# Patient Record
Sex: MALE | Race: WHITE | NOT HISPANIC OR LATINO | Employment: FULL TIME | URBAN - METROPOLITAN AREA
[De-identification: names, ages, dates, MRNs, and addresses within clinical notes are randomized per-mention and may not be internally consistent; named-entity substitution may affect disease eponyms.]

---

## 2018-03-25 ENCOUNTER — HOSPITAL ENCOUNTER (EMERGENCY)
Facility: HOSPITAL | Age: 63
Discharge: HOME | End: 2018-03-25
Attending: EMERGENCY MEDICINE | Admitting: EMERGENCY MEDICINE

## 2018-03-25 VITALS
HEIGHT: 68 IN | BODY MASS INDEX: 27.74 KG/M2 | DIASTOLIC BLOOD PRESSURE: 101 MMHG | TEMPERATURE: 99 F | RESPIRATION RATE: 16 BRPM | HEART RATE: 86 BPM | OXYGEN SATURATION: 98 % | WEIGHT: 183 LBS | SYSTOLIC BLOOD PRESSURE: 146 MMHG

## 2018-03-25 DIAGNOSIS — H10.9 CONJUNCTIVITIS OF LEFT EYE, UNSPECIFIED CONJUNCTIVITIS TYPE: Primary | ICD-10-CM

## 2018-03-25 PROCEDURE — 99282 EMERGENCY DEPT VISIT SF MDM: CPT | Performed by: EMERGENCY MEDICINE

## 2018-03-25 PROCEDURE — 99283 EMERGENCY DEPT VISIT LOW MDM: CPT

## 2018-03-25 RX ORDER — TOBRAMYCIN 3 MG/ML
2 SOLUTION/ DROPS OPHTHALMIC ONCE
Status: COMPLETED | OUTPATIENT
Start: 2018-03-25 | End: 2018-03-25

## 2018-03-25 RX ADMIN — TOBRAMYCIN 2 DROP: 3 SOLUTION OPHTHALMIC at 23:12

## 2018-03-26 NOTE — DISCHARGE INSTRUCTIONS
Use the eyedrop medication that we provided every 4 hours as directed for the next 7 days or until symptoms are totally resolved.  Please return to the ER for any worsening pain, fevers, swelling, redness, discharge, vision changes or any other concerns.

## 2018-03-26 NOTE — ED PROVIDER NOTES
Subjective   History of Present Illness  History of Present Illness    Chief complaint: Eye redness and drainage    Location: Left eye    Quality/Severity:  Moderate redness and drainage    Timing/Duration: Began today    Modifying Factors: None    Narrative: This patient presents for evaluation of new redness and swelling with some drainage from his left eye.  He says that he first noticed it this morning when he woke up.  He had some crusty drainage on his eyelashes.  Throughout the day, the eye has become more itchy and uncomfortable with some redness and some watery drainage.  He says that he feels like his eyelids are getting swollen too.  He denies any vision changes.  He denies any fevers.  He has had some sinus congestion recently.  He denies any cough or difficulties breathing.  Wears prescription eyeglasses but he has not had any recent procedures or injuries to the eye.    Associated Symptoms: As above    Review of Systems   Constitutional: Negative for activity change and fever.   HENT: Positive for congestion, rhinorrhea and sinus pressure. Negative for sore throat.    Eyes: Positive for pain, discharge, redness and itching. Negative for visual disturbance.   Respiratory: Negative for cough and shortness of breath.    Cardiovascular: Negative for chest pain.   Gastrointestinal: Negative for abdominal pain.   Skin: Negative for wound.   Neurological: Negative for syncope and headaches.   All other systems reviewed and are negative.      Past Medical History:   Diagnosis Date   • GERD (gastroesophageal reflux disease)        No Known Allergies    Past Surgical History:   Procedure Laterality Date   • HERNIA REPAIR     • TONSILLECTOMY         History reviewed. No pertinent family history.    Social History     Social History   • Marital status:      Social History Main Topics   • Smoking status: Never Smoker   • Alcohol use No   • Drug use: No     Other Topics Concern   • Not on file       ED Triage  Vitals [03/25/18 2252]   Temp Heart Rate Resp BP SpO2   99 °F (37.2 °C) 86 16 (!) 146/101 98 %      Temp src Heart Rate Source Patient Position BP Location FiO2 (%)   Oral -- -- -- --         Objective   Physical Exam   Constitutional: He is oriented to person, place, and time. He appears well-developed and well-nourished. No distress.   HENT:   Head: Normocephalic and atraumatic.   Right Ear: External ear normal.   Left Ear: External ear normal.   Nose: Nose normal.   Eyes: EOM are normal. Pupils are equal, round, and reactive to light. Right eye exhibits no discharge. Left eye exhibits discharge.   The right eye is totally normal.  Excellent the left eye shows some inflamed conjunctiva diffusely with some watery clear to light brown discharge evident.  The pupil and iris appear normal.   Neck: Normal range of motion. Neck supple.   Pulmonary/Chest: Effort normal. No respiratory distress.   Musculoskeletal: Normal range of motion. He exhibits no edema or deformity.   Neurological: He is alert and oriented to person, place, and time. He exhibits normal muscle tone.   Skin: Skin is warm and dry. No rash noted. He is not diaphoretic. No erythema. No pallor.   Psychiatric: He has a normal mood and affect. His behavior is normal. Judgment and thought content normal.   Nursing note and vitals reviewed.      Procedures         ED Course  ED Course   Comment By Time   Impression presented for some eye redness and drainage.  His exam was consistent with acute conjunctivitis.  We gave him some drops of tobramycin tonight.  Gave him the bottle of tobramycin to continue therapies for the next 7 days.  Advised follow-up with his prior care doctor.  Gave him a work statement as well. Gualberto Hunter MD 03/25 1849                  [place]    Final diagnoses:   Conjunctivitis of left eye, unspecified conjunctivitis type            Gualberto Hunter MD  03/25/18 1102

## 2021-02-20 ENCOUNTER — APPOINTMENT (OUTPATIENT)
Dept: MRI IMAGING | Facility: HOSPITAL | Age: 66
End: 2021-02-20

## 2021-02-20 ENCOUNTER — HOSPITAL ENCOUNTER (EMERGENCY)
Facility: HOSPITAL | Age: 66
Discharge: HOME | End: 2021-02-20
Attending: EMERGENCY MEDICINE | Admitting: EMERGENCY MEDICINE

## 2021-02-20 ENCOUNTER — APPOINTMENT (OUTPATIENT)
Dept: CT IMAGING | Facility: HOSPITAL | Age: 66
End: 2021-02-20

## 2021-02-20 VITALS
OXYGEN SATURATION: 96 % | HEART RATE: 77 BPM | DIASTOLIC BLOOD PRESSURE: 99 MMHG | WEIGHT: 185 LBS | SYSTOLIC BLOOD PRESSURE: 141 MMHG | RESPIRATION RATE: 16 BRPM | HEIGHT: 68 IN | BODY MASS INDEX: 28.04 KG/M2 | TEMPERATURE: 98 F

## 2021-02-20 DIAGNOSIS — H53.9 VISION ABNORMALITIES: Primary | ICD-10-CM

## 2021-02-20 DIAGNOSIS — H52.13 MYOPIA OF BOTH EYES: ICD-10-CM

## 2021-02-20 LAB
ALBUMIN SERPL-MCNC: 4.2 G/DL (ref 3.5–5.2)
ALBUMIN/GLOB SERPL: 1.4 G/DL
ALP SERPL-CCNC: 76 U/L (ref 39–117)
ALT SERPL W P-5'-P-CCNC: 17 U/L (ref 1–41)
ANION GAP SERPL CALCULATED.3IONS-SCNC: 6.7 MMOL/L (ref 5–15)
APTT PPP: 31.2 SECONDS (ref 24.3–38.1)
AST SERPL-CCNC: 13 U/L (ref 1–40)
BASOPHILS # BLD AUTO: 0.07 10*3/MM3 (ref 0–0.2)
BASOPHILS NFR BLD AUTO: 1.2 % (ref 0–1.5)
BILIRUB SERPL-MCNC: 0.3 MG/DL (ref 0–1.2)
BUN SERPL-MCNC: 14 MG/DL (ref 8–23)
BUN/CREAT SERPL: 15.2 (ref 7–25)
CALCIUM SPEC-SCNC: 9.5 MG/DL (ref 8.6–10.5)
CHLORIDE SERPL-SCNC: 103 MMOL/L (ref 98–107)
CO2 SERPL-SCNC: 27.3 MMOL/L (ref 22–29)
CREAT SERPL-MCNC: 0.92 MG/DL (ref 0.76–1.27)
DEPRECATED RDW RBC AUTO: 45.1 FL (ref 37–54)
EOSINOPHIL # BLD AUTO: 0.3 10*3/MM3 (ref 0–0.4)
EOSINOPHIL NFR BLD AUTO: 5 % (ref 0.3–6.2)
ERYTHROCYTE [DISTWIDTH] IN BLOOD BY AUTOMATED COUNT: 13.3 % (ref 12.3–15.4)
GFR SERPL CREATININE-BSD FRML MDRD: 83 ML/MIN/1.73
GLOBULIN UR ELPH-MCNC: 2.9 GM/DL
GLUCOSE SERPL-MCNC: 95 MG/DL (ref 65–99)
HCT VFR BLD AUTO: 45.5 % (ref 37.5–51)
HGB BLD-MCNC: 14.6 G/DL (ref 13–17.7)
IMM GRANULOCYTES # BLD AUTO: 0.01 10*3/MM3 (ref 0–0.05)
IMM GRANULOCYTES NFR BLD AUTO: 0.2 % (ref 0–0.5)
INR PPP: 1.05 (ref 0.9–1.1)
LYMPHOCYTES # BLD AUTO: 1.79 10*3/MM3 (ref 0.7–3.1)
LYMPHOCYTES NFR BLD AUTO: 29.9 % (ref 19.6–45.3)
MCH RBC QN AUTO: 29.4 PG (ref 26.6–33)
MCHC RBC AUTO-ENTMCNC: 32.1 G/DL (ref 31.5–35.7)
MCV RBC AUTO: 91.5 FL (ref 79–97)
MONOCYTES # BLD AUTO: 0.57 10*3/MM3 (ref 0.1–0.9)
MONOCYTES NFR BLD AUTO: 9.5 % (ref 5–12)
NEUTROPHILS NFR BLD AUTO: 3.24 10*3/MM3 (ref 1.7–7)
NEUTROPHILS NFR BLD AUTO: 54.2 % (ref 42.7–76)
NRBC BLD AUTO-RTO: 0 /100 WBC (ref 0–0.2)
PLATELET # BLD AUTO: 310 10*3/MM3 (ref 140–450)
PMV BLD AUTO: 10.5 FL (ref 6–12)
POTASSIUM SERPL-SCNC: 4.3 MMOL/L (ref 3.5–5.2)
PROT SERPL-MCNC: 7.1 G/DL (ref 6–8.5)
PROTHROMBIN TIME: 13.4 SECONDS (ref 12.1–15)
QT INTERVAL: 429 MS
RBC # BLD AUTO: 4.97 10*6/MM3 (ref 4.14–5.8)
SARS-COV-2 RNA PNL SPEC NAA+PROBE: NOT DETECTED
SODIUM SERPL-SCNC: 137 MMOL/L (ref 136–145)
WBC # BLD AUTO: 5.98 10*3/MM3 (ref 3.4–10.8)

## 2021-02-20 PROCEDURE — 85730 THROMBOPLASTIN TIME PARTIAL: CPT | Performed by: PHYSICIAN ASSISTANT

## 2021-02-20 PROCEDURE — 70551 MRI BRAIN STEM W/O DYE: CPT

## 2021-02-20 PROCEDURE — 99283 EMERGENCY DEPT VISIT LOW MDM: CPT | Performed by: PHYSICIAN ASSISTANT

## 2021-02-20 PROCEDURE — 99283 EMERGENCY DEPT VISIT LOW MDM: CPT

## 2021-02-20 PROCEDURE — 85025 COMPLETE CBC W/AUTO DIFF WBC: CPT | Performed by: PHYSICIAN ASSISTANT

## 2021-02-20 PROCEDURE — 85610 PROTHROMBIN TIME: CPT | Performed by: PHYSICIAN ASSISTANT

## 2021-02-20 PROCEDURE — 80053 COMPREHEN METABOLIC PANEL: CPT | Performed by: PHYSICIAN ASSISTANT

## 2021-02-20 PROCEDURE — 70450 CT HEAD/BRAIN W/O DYE: CPT

## 2021-02-20 PROCEDURE — 93010 ELECTROCARDIOGRAM REPORT: CPT | Performed by: INTERNAL MEDICINE

## 2021-02-20 PROCEDURE — 93005 ELECTROCARDIOGRAM TRACING: CPT | Performed by: PHYSICIAN ASSISTANT

## 2021-02-20 PROCEDURE — 87635 SARS-COV-2 COVID-19 AMP PRB: CPT | Performed by: PHYSICIAN ASSISTANT

## 2021-02-20 RX ORDER — SODIUM CHLORIDE 0.9 % (FLUSH) 0.9 %
10 SYRINGE (ML) INJECTION AS NEEDED
Status: DISCONTINUED | OUTPATIENT
Start: 2021-02-20 | End: 2021-02-20 | Stop reason: HOSPADM

## 2021-02-20 RX ORDER — CLOBETASOL PROPIONATE 0.46 MG/ML
SOLUTION TOPICAL
COMMUNITY
Start: 2021-02-12

## 2021-02-20 RX ORDER — FLUTICASONE PROPIONATE 50 MCG
SPRAY, SUSPENSION (ML) NASAL
COMMUNITY
Start: 2020-12-08

## 2021-02-20 RX ORDER — KETOCONAZOLE 20 MG/ML
SHAMPOO TOPICAL
COMMUNITY
Start: 2021-02-12

## 2021-02-20 NOTE — ED PROVIDER NOTES
EMERGENCY DEPARTMENT ENCOUNTER      Room Number: 05/05    History is provided by the patient, no translation services needed    HPI:    Chief complaint: Blurred vision    Location: Patient denies pain    Quality/Severity: 0/10    Timing/Duration: Blurred vision began around 9 AM this morning.    Modifying Factors: Patient states when he looks at objects in a distance he has double vision, he states when he focuses on objects that are close his vision is normal.    Associated Symptoms: Positive for blurred vision.  Denies any headache, eye pain, dizziness, numbness, weakness, difficulty swallowing, slurred speech, gait abnormality, facial droop, nausea, vomiting.    Narrative: Pt is a 65 y.o. male who presents complaining of blurred vision that began around 9 AM this morning.  Patient has a PMH significant for Márquez's esophagus and GERD.  Denies any history of HTN or diabetes.  Patient states he started noticing some blurred vision in eyes this morning around 9 AM.  Patient states he wears bifocal glasses, last had his vision checked about 5 months ago.  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  He states he was at home reading on his tablet just fine this morning, he went out to his car and could see normally, but when he was driving he states he began having blurred vision in the distance and was seeing double.  He reports when he looks at an object up close his vision is normal.  Denies any other symptoms with this.    PMD: Rusty Carr MD    REVIEW OF SYSTEMS  Review of Systems   Constitutional: Negative for chills and fever.   Eyes: Positive for visual disturbance. Negative for pain, discharge and itching.   Respiratory: Negative for cough and shortness of breath.    Cardiovascular: Negative for chest pain and palpitations.   Gastrointestinal: Negative for abdominal pain, nausea and vomiting.    Genitourinary: Negative for difficulty urinating and dysuria.   Musculoskeletal: Negative for arthralgias, gait problem and myalgias.   Skin: Negative for pallor and rash.   Neurological: Negative for dizziness, syncope, facial asymmetry, speech difficulty, weakness, numbness and headaches.   Psychiatric/Behavioral: Negative for confusion. The patient is not nervous/anxious.          PAST MEDICAL HISTORY  Active Ambulatory Problems     Diagnosis Date Noted   • No Active Ambulatory Problems     Resolved Ambulatory Problems     Diagnosis Date Noted   • No Resolved Ambulatory Problems     Past Medical History:   Diagnosis Date   • Márquez esophagus    • GERD (gastroesophageal reflux disease)        PAST SURGICAL HISTORY  Past Surgical History:   Procedure Laterality Date   • COLONOSCOPY     • HERNIA REPAIR     • TONSILLECTOMY         FAMILY HISTORY  History reviewed. No pertinent family history.    SOCIAL HISTORY  Social History     Socioeconomic History   • Marital status:      Spouse name: Not on file   • Number of children: Not on file   • Years of education: Not on file   • Highest education level: Not on file   Tobacco Use   • Smoking status: Never Smoker   • Smokeless tobacco: Never Used   Substance and Sexual Activity   • Alcohol use: No   • Drug use: No   • Sexual activity: Not Currently       ALLERGIES  Patient has no known allergies.      Current Facility-Administered Medications:   •  [COMPLETED] Insert peripheral IV, , , Once **AND** sodium chloride 0.9 % flush 10 mL, 10 mL, Intravenous, PRN, Madisyn Carreno PA-C    Current Outpatient Medications:   •  clobetasol (TEMOVATE) 0.05 % external solution, APPLY TO AFFECTED AREA OF SCALP 2 TIMES A DAY FOR UP TO 2 WEEKS, Disp: , Rfl:   •  fluticasone (FLONASE) 50 MCG/ACT nasal spray, instill 2 sprays in each nostril daily, Disp: , Rfl:   •  fluticasone-salmeterol (Advair Diskus) 100-50 MCG/DOSE DISKUS, INHALE 1 PUFF BY MOUTH EVERY TWELVE HOURS, RINSE  MOUTH AFTER EACH USE, Disp: , Rfl:   •  ketoconazole (NIZORAL) 2 % shampoo, APPLY TO AFFECTED AREA OF SCALP 2 TIMES PER WEEK, RINSE AFTER 5 TO 10 MINUTES, Disp: , Rfl:     PHYSICAL EXAM  ED Triage Vitals [02/20/21 1224]   Temp Heart Rate Resp BP SpO2   98 °F (36.7 °C) 64 18 129/75 98 %      Temp src Heart Rate Source Patient Position BP Location FiO2 (%)   Oral Monitor Sitting Left arm --       Physical Exam   Constitutional: He is oriented to person, place, and time and well-developed, well-nourished, and in no distress.   HENT:   Head: Normocephalic and atraumatic.   Eyes: Pupils are equal, round, and reactive to light. Conjunctivae and EOM are normal.   Visual acuity in left eye 20/30, right eye 20/15   Cardiovascular: Normal rate, regular rhythm and intact distal pulses.   Pulmonary/Chest: Effort normal and breath sounds normal.   Abdominal: Soft. Bowel sounds are normal. There is no abdominal tenderness. There is no guarding.   Musculoskeletal: Normal range of motion.         General: No edema.   Neurological: He is alert and oriented to person, place, and time. He has normal sensation, normal strength and intact cranial nerves. He displays normal speech. He exhibits normal muscle tone. He has a normal Finger-Nose-Finger Test and a normal Heel to Scanlon Test. He shows no pronator drift. Gait normal. GCS score is 15.   Skin: Skin is warm and dry.   Psychiatric: Mood, memory, affect and judgment normal.   Nursing note and vitals reviewed.        LAB RESULTS  Lab Results (last 24 hours)     Procedure Component Value Units Date/Time    CBC & Differential [[id number]]  (Normal) Collected: 02/20/21 1343    Specimen: Blood Updated: 02/20/21 1355    Narrative:      The following orders were created for panel order CBC & Differential.  Procedure                               Abnormality         Status                     ---------                               -----------         ------                     CBC Auto  Differential[[id number]]          Normal              Final result                 Please view results for these tests on the individual orders.    Comprehensive Metabolic Panel [[id number]] Collected: 02/20/21 1343    Specimen: Blood Updated: 02/20/21 1418     Glucose 95 mg/dL      BUN 14 mg/dL      Creatinine 0.92 mg/dL      Sodium 137 mmol/L      Potassium 4.3 mmol/L      Chloride 103 mmol/L      CO2 27.3 mmol/L      Calcium 9.5 mg/dL      Total Protein 7.1 g/dL      Albumin 4.20 g/dL      ALT (SGPT) 17 U/L      AST (SGOT) 13 U/L      Alkaline Phosphatase 76 U/L      Total Bilirubin 0.3 mg/dL      eGFR Non African Amer 83 mL/min/1.73      Globulin 2.9 gm/dL      A/G Ratio 1.4 g/dL      BUN/Creatinine Ratio 15.2     Anion Gap 6.7 mmol/L     Narrative:      GFR Normal >60  Chronic Kidney Disease <60  Kidney Failure <15      aPTT [[id number]]  (Normal) Collected: 02/20/21 1343    Specimen: Blood Updated: 02/20/21 1405     PTT 31.2 seconds     Narrative:      PTT = The equivalent PTT values for the therapeutic range of heparin levels at 0.1 to 0.7 U/ml are 53 to 110 seconds.      Protime-INR [[id number]]  (Normal) Collected: 02/20/21 1343    Specimen: Blood Updated: 02/20/21 1405     Protime 13.4 Seconds      INR 1.05    Narrative:      Therapeutic Ranges for INR: 2.0-3.0 (PT 20-30)                              2.5-3.5 (PT 25-34)    CBC Auto Differential [[id number]]  (Normal) Collected: 02/20/21 1343    Specimen: Blood Updated: 02/20/21 1355     WBC 5.98 10*3/mm3      RBC 4.97 10*6/mm3      Hemoglobin 14.6 g/dL      Hematocrit 45.5 %      MCV 91.5 fL      MCH 29.4 pg      MCHC 32.1 g/dL      RDW 13.3 %      RDW-SD 45.1 fl      MPV 10.5 fL      Platelets 310 10*3/mm3      Neutrophil % 54.2 %      Lymphocyte % 29.9 %      Monocyte % 9.5 %      Eosinophil % 5.0 %      Basophil % 1.2 %      Immature Grans % 0.2 %      Neutrophils, Absolute 3.24 10*3/mm3      Lymphocytes, Absolute 1.79 10*3/mm3      Monocytes, Absolute 0.57 10*3/mm3       Eosinophils, Absolute 0.30 10*3/mm3      Basophils, Absolute 0.07 10*3/mm3      Immature Grans, Absolute 0.01 10*3/mm3      nRBC 0.0 /100 WBC     COVID PRE-OP / PRE-PROCEDURE SCREENING ORDER (NO ISOLATION) - Swab, Nasal Cavity [[id number]]  (Normal) Collected: 02/20/21 1422    Specimen: Swab from Nasal Cavity Updated: 02/20/21 1523    Narrative:      The following orders were created for panel order COVID PRE-OP / PRE-PROCEDURE SCREENING ORDER (NO ISOLATION) - Swab, Nasal Cavity.  Procedure                               Abnormality         Status                     ---------                               -----------         ------                     COVID-19,[facility] IN-HOUSE...[[id number]]  Normal              Final result                 Please view results for these tests on the individual orders.    COVID-19,[facility] IN-HOUSE,Nasal Swab No Transport Media 3-4 HR TAT - Swab, Nasal Cavity [[id number]]  (Normal) Collected: 02/20/21 1422    Specimen: Swab from Nasal Cavity Updated: 02/20/21 1523     COVID19 Not Detected    Narrative:      Fact sheet for providers: https://www.fda.gov/media/990603/download     Fact sheet for patients: https://www.fda.gov/media/393188/download    Test performed by PCR.    Consider negative results in combination with clinical observations, patient history, and epidemiological information.            I ordered the above labs and reviewed the results    RADIOLOGY  Ct Head Without Contrast    Result Date: 2/20/2021  CT Head WO: 2/20/2021 3:36 PM HISTORY: Diplopia, blurred vision TECHNIQUE: Axial unenhanced head CT. Radiation dose reduction techniques included automated exposure control or exposure modulation based on body size. Radiation audit for number of CT and nuclear cardiology exams performed in the last year: 0.  COMPARISON: None. FINDINGS: No intracranial hemorrhage, mass, or infarct. No hydrocephalus or extra-axial fluid collection. The left cerebellar tonsil is slightly ectopic  and there may be a mild Chiari malformation. The skull base, calvarium, and extracranial soft tissues are normal.     No acute intracranial abnormality. Signer Name: Racquel Bonner MD  Signed: 2/20/2021 2:42 PM  Workstation Name: [id number]  [facility]    Mri Brain Without Contrast    Result Date: 2/20/2021  MRI Brain WO INDICATION: Diplopia, concern for stroke TECHNIQUE: Multiplanar MRI of the brain without IV contrast. COMPARISON: None available. FINDINGS: No evidence of restricted diffusion to suggest acute infarct. No evidence of intracranial hemorrhage, mass or mass effect. No extra-axial fluid collections. Again noted is slight downward displacement of the left cerebellar tonsil that may be consistent with a mild Chiari malformation. Visualized osseous structures and extracranial soft tissues appear within normal limits apart from trace mucosal thickening in the paranasal sinuses. Normal intracranial flow-voids.     No acute intracranial abnormality. Signer Name: Racquel Bonner MD  Signed: 2/20/2021 5:19 PM  Workstation Name: [id number]  [facility]      I ordered the above radiologic testing and reviewed the results    PROCEDURES  Procedures      PROGRESS AND CONSULTS  ED Course as of Feb 20 1748   Sat Feb 20, 2021   1335 CONSULT  Discussed case with Dr. Valiente, neurology.  Reviewed history, exam, results and treatments.  Discussed concerns and plan of care. Dr Valiente recommends patient be admitted for MRI at either our facility or [facility].  Advises to proceed with stroke protocol, advises against CTA at this time since we are not suspicious of any large vessel occlusion.  Patient continues to have NIH of 0 with no visual loss or gaze palsy, so is not currently a TPA candidate.        [KS]   1350 EKG         EKG time / Interpretation time: 1342/1345  Rhythm/Rate: Sinus rhythm, 66   NV: 179  QRS, axis: 24  QTc 448  ST and T waves: There is no  significant ST elevation or depression, no T wave inversion.  EKG Tracing Interpreted Contemporaneously by me, independently viewed by me and MD.  No prior EKG with which to compare.      [KS]   1550 Spoke with MRI tech who is coming in to perform MRI at this time.  Discussed with Dr. Davis who states we can go ahead and order MRI brain without contrast, and if MRI is normal patient may be discharged home.    [KS]   7079 Discussed MRI results with patient.  MRI shows no abnormalities or evidence of CVA.  Discussed that I feel his symptoms are suggestive for myopia.  He already has an appointment with ophthalmology scheduled for Monday, discussed he should also follow-up with his PCP.  Discussed return to ER warnings.  Patient verbalizes understanding and is agreeable with this plan.    [KS]      ED Course User Index  [KS] Madisyn Carreno PA-C           MEDICAL DECISION MAKING    MDM      NIHSS (NIH Stroke Scale/Score) reviewed and/or performed as part of the patient evaluation and treatment planning process.  The result associated with this review/performance is: 0    My differential diagnoses for ocular issues includes but is not limited to acute eye pain, chemical conjunctivitis, allergic conjunctivitis, infectious conjunctivitis, gonococcal conjunctivitis, corneal abrasion, corneal ulcer, injury to cornea from contact lens, corneal foreign body.  Corneal alkali burn, corneal acid burn, decreased vision, acute glaucoma, herpes keratitis, hyphema, acute iritis, orbital wall fracture, penetrating eye injury, retinal detachment, temporal arteritis, UV keratitis, central retinal artery occlusion, CVA.      DIAGNOSIS  Final diagnoses:   Vision abnormalities   Myopia of both eyes       Latest Documented Vital Signs:  As of 17:48 EST  BP- 137/100 HR- 70 Temp- 98 °F (36.7 °C) (Oral) O2 sat- 99%    DISPOSITION  Patient discharged home.    Discussed pertinent labs and imaging findings with the patient/family.   Patient/Family voiced understanding of need to follow-up for recheck, further testing as needed.  Return to the emergency Department warnings were given.         Medication List      No changes were made to your prescriptions during this visit.             Follow-up Information     Rusty Carr MD. Call in 2 days.    Specialty: Family Medicine  Why: To schedule follow-up appointment  Contact information:  [address] [place] [id number]  [phone]             Your Opthalmologist. Go in 2 days.    Why: As previously scheduled                   Dictated utilizing Dragon dictation     Madisyn Carreno PA-C  02/20/21 0030

## 2021-09-28 ENCOUNTER — HOSPITAL ENCOUNTER (EMERGENCY)
Facility: HOSPITAL | Age: 66
Discharge: HOME | End: 2021-09-28
Attending: EMERGENCY MEDICINE | Admitting: EMERGENCY MEDICINE

## 2021-09-28 VITALS
HEIGHT: 68 IN | BODY MASS INDEX: 26.52 KG/M2 | WEIGHT: 175 LBS | HEART RATE: 77 BPM | SYSTOLIC BLOOD PRESSURE: 145 MMHG | RESPIRATION RATE: 16 BRPM | DIASTOLIC BLOOD PRESSURE: 93 MMHG | OXYGEN SATURATION: 98 %

## 2021-09-28 DIAGNOSIS — R04.0 EPISTAXIS: Primary | ICD-10-CM

## 2021-09-28 PROCEDURE — 99282 EMERGENCY DEPT VISIT SF MDM: CPT | Performed by: EMERGENCY MEDICINE

## 2021-09-28 PROCEDURE — 99283 EMERGENCY DEPT VISIT LOW MDM: CPT

## 2024-09-19 ENCOUNTER — LAB (OUTPATIENT)
Dept: LAB | Facility: HOSPITAL | Age: 69
End: 2024-09-19
Payer: MEDICARE

## 2024-09-19 ENCOUNTER — TRANSCRIBE ORDERS (OUTPATIENT)
Dept: ADMINISTRATIVE | Facility: HOSPITAL | Age: 69
End: 2024-09-19
Payer: MEDICARE

## 2024-09-19 DIAGNOSIS — L40.0 PSORIASIS VULGARIS: Primary | ICD-10-CM

## 2024-09-19 PROCEDURE — 36415 COLL VENOUS BLD VENIPUNCTURE: CPT

## 2024-09-19 PROCEDURE — 86480 TB TEST CELL IMMUN MEASURE: CPT

## 2024-09-21 LAB
GAMMA INTERFERON BACKGROUND BLD IA-ACNC: 0 IU/ML
M TB IFN-G BLD-IMP: NEGATIVE
M TB IFN-G CD4+ BCKGRND COR BLD-ACNC: 0.02 IU/ML
M TB IFN-G CD4+CD8+ BCKGRND COR BLD-ACNC: 0.03 IU/ML
MITOGEN IGNF BCKGRD COR BLD-ACNC: >10 IU/ML
QUANTIFERON INCUBATION: NORMAL
SERVICE CMNT-IMP: NORMAL

## 2025-06-13 ENCOUNTER — APPOINTMENT (OUTPATIENT)
Dept: GENERAL RADIOLOGY | Facility: HOSPITAL | Age: 70
End: 2025-06-13
Payer: MEDICARE

## 2025-06-13 ENCOUNTER — HOSPITAL ENCOUNTER (EMERGENCY)
Facility: HOSPITAL | Age: 70
Discharge: HOME | End: 2025-06-13
Attending: EMERGENCY MEDICINE
Payer: MEDICARE

## 2025-06-13 VITALS
HEART RATE: 82 BPM | OXYGEN SATURATION: 98 % | SYSTOLIC BLOOD PRESSURE: 123 MMHG | BODY MASS INDEX: 28.34 KG/M2 | HEIGHT: 68 IN | DIASTOLIC BLOOD PRESSURE: 94 MMHG | TEMPERATURE: 98.2 F | WEIGHT: 187 LBS | RESPIRATION RATE: 20 BRPM

## 2025-06-13 DIAGNOSIS — S20.219A CONTUSION OF STERNUM, INITIAL ENCOUNTER: Primary | ICD-10-CM

## 2025-06-13 PROCEDURE — 93010 ELECTROCARDIOGRAM REPORT: CPT | Performed by: INTERNAL MEDICINE

## 2025-06-13 PROCEDURE — 71120 X-RAY EXAM BREASTBONE 2/>VWS: CPT

## 2025-06-13 PROCEDURE — 99283 EMERGENCY DEPT VISIT LOW MDM: CPT | Performed by: EMERGENCY MEDICINE

## 2025-06-13 PROCEDURE — 71045 X-RAY EXAM CHEST 1 VIEW: CPT

## 2025-06-13 PROCEDURE — 93005 ELECTROCARDIOGRAM TRACING: CPT | Performed by: EMERGENCY MEDICINE

## 2025-06-13 RX ORDER — PAROXETINE 10 MG/1
10 TABLET, FILM COATED ORAL EVERY MORNING
COMMUNITY

## 2025-06-14 LAB
QT INTERVAL: 372 MS
QTC INTERVAL: 430 MS
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
## 2025-06-14 NOTE — DISCHARGE INSTRUCTIONS
Ice the injured area for 20 minutes every 2-3 hours while awake for 2 to 3 days.  Take Tylenol or Motrin as needed as directed for pain.  Follow-up with Rusty Carr within 1 week.  Return to the emergency department if there is increased pain, rest of breath, worse in any way at all.

## 2025-07-30 ENCOUNTER — HOSPITAL ENCOUNTER (OUTPATIENT)
Facility: HOSPITAL | Age: 70
Setting detail: OBSERVATION
Discharge: HOME | End: 2025-08-01
Attending: STUDENT IN AN ORGANIZED HEALTH CARE EDUCATION/TRAINING PROGRAM | Admitting: STUDENT IN AN ORGANIZED HEALTH CARE EDUCATION/TRAINING PROGRAM
Payer: MEDICARE

## 2025-07-30 ENCOUNTER — APPOINTMENT (OUTPATIENT)
Dept: CT IMAGING | Facility: HOSPITAL | Age: 70
End: 2025-07-30
Payer: MEDICARE

## 2025-07-30 DIAGNOSIS — R19.5 DARK STOOLS: Primary | ICD-10-CM

## 2025-07-30 DIAGNOSIS — Z87.19 HISTORY OF HEMORRHOIDS: ICD-10-CM

## 2025-07-30 LAB
ABO GROUP BLD: NORMAL
ABO GROUP BLD: NORMAL
ALBUMIN SERPL-MCNC: 4.1 G/DL (ref 3.5–5.2)
ALBUMIN/GLOB SERPL: 1.5 G/DL
ALP SERPL-CCNC: 84 U/L (ref 39–117)
ALT SERPL W P-5'-P-CCNC: 17 U/L (ref 1–41)
ANION GAP SERPL CALCULATED.3IONS-SCNC: 9.9 MMOL/L (ref 5–15)
AST SERPL-CCNC: 15 U/L (ref 1–40)
BASOPHILS # BLD AUTO: 0.11 10*3/MM3 (ref 0–0.2)
BASOPHILS NFR BLD AUTO: 1.8 % (ref 0–1.5)
BILIRUB SERPL-MCNC: 0.2 MG/DL (ref 0–1.2)
BLD GP AB SCN SERPL QL: NEGATIVE
BUN SERPL-MCNC: 18.7 MG/DL (ref 8–23)
BUN/CREAT SERPL: 22.5 (ref 7–25)
CALCIUM SPEC-SCNC: 9.5 MG/DL (ref 8.6–10.5)
CHLORIDE SERPL-SCNC: 107 MMOL/L (ref 98–107)
CO2 SERPL-SCNC: 25.1 MMOL/L (ref 22–29)
CREAT SERPL-MCNC: 0.83 MG/DL (ref 0.76–1.27)
DEPRECATED RDW RBC AUTO: 43.8 FL (ref 37–54)
EGFRCR SERPLBLD CKD-EPI 2021: 95.3 ML/MIN/1.73
EOSINOPHIL # BLD AUTO: 0.44 10*3/MM3 (ref 0–0.4)
EOSINOPHIL NFR BLD AUTO: 7.2 % (ref 0.3–6.2)
ERYTHROCYTE [DISTWIDTH] IN BLOOD BY AUTOMATED COUNT: 13.3 % (ref 12.3–15.4)
GLOBULIN UR ELPH-MCNC: 2.7 GM/DL
GLUCOSE SERPL-MCNC: 97 MG/DL (ref 65–99)
HCT VFR BLD AUTO: 35.1 % (ref 37.5–51)
HCT VFR BLD AUTO: 39.8 % (ref 37.5–51)
HGB BLD-MCNC: 11.4 G/DL (ref 13–17.7)
HGB BLD-MCNC: 13.4 G/DL (ref 13–17.7)
IMM GRANULOCYTES # BLD AUTO: 0.01 10*3/MM3 (ref 0–0.05)
IMM GRANULOCYTES NFR BLD AUTO: 0.2 % (ref 0–0.5)
INR PPP: 1.05 (ref 0.9–1.1)
LIPASE SERPL-CCNC: 47 U/L (ref 13–60)
LYMPHOCYTES # BLD AUTO: 2.26 10*3/MM3 (ref 0.7–3.1)
LYMPHOCYTES NFR BLD AUTO: 36.9 % (ref 19.6–45.3)
MCH RBC QN AUTO: 30.6 PG (ref 26.6–33)
MCHC RBC AUTO-ENTMCNC: 33.7 G/DL (ref 31.5–35.7)
MCV RBC AUTO: 90.9 FL (ref 79–97)
MONOCYTES # BLD AUTO: 0.59 10*3/MM3 (ref 0.1–0.9)
MONOCYTES NFR BLD AUTO: 9.6 % (ref 5–12)
NEUTROPHILS NFR BLD AUTO: 2.71 10*3/MM3 (ref 1.7–7)
NEUTROPHILS NFR BLD AUTO: 44.3 % (ref 42.7–76)
NRBC BLD AUTO-RTO: 0 /100 WBC (ref 0–0.2)
PLATELET # BLD AUTO: 318 10*3/MM3 (ref 140–450)
PMV BLD AUTO: 10.6 FL (ref 6–12)
POTASSIUM SERPL-SCNC: 4.4 MMOL/L (ref 3.5–5.2)
PROT SERPL-MCNC: 6.8 G/DL (ref 6–8.5)
PROTHROMBIN TIME: 14 SECONDS (ref 12.1–15)
RBC # BLD AUTO: 4.38 10*6/MM3 (ref 4.14–5.8)
RH BLD: POSITIVE
RH BLD: POSITIVE
SODIUM SERPL-SCNC: 142 MMOL/L (ref 136–145)
T&S EXPIRATION DATE: NORMAL
WBC NRBC COR # BLD AUTO: 6.12 10*3/MM3 (ref 3.4–10.8)

## 2025-07-30 PROCEDURE — 25810000003 LACTATED RINGERS SOLUTION: Performed by: STUDENT IN AN ORGANIZED HEALTH CARE EDUCATION/TRAINING PROGRAM

## 2025-07-30 PROCEDURE — 85018 HEMOGLOBIN: CPT | Performed by: STUDENT IN AN ORGANIZED HEALTH CARE EDUCATION/TRAINING PROGRAM

## 2025-07-30 PROCEDURE — 99285 EMERGENCY DEPT VISIT HI MDM: CPT | Performed by: STUDENT IN AN ORGANIZED HEALTH CARE EDUCATION/TRAINING PROGRAM

## 2025-07-30 PROCEDURE — 80053 COMPREHEN METABOLIC PANEL: CPT | Performed by: STUDENT IN AN ORGANIZED HEALTH CARE EDUCATION/TRAINING PROGRAM

## 2025-07-30 PROCEDURE — 83690 ASSAY OF LIPASE: CPT | Performed by: STUDENT IN AN ORGANIZED HEALTH CARE EDUCATION/TRAINING PROGRAM

## 2025-07-30 PROCEDURE — 96361 HYDRATE IV INFUSION ADD-ON: CPT

## 2025-07-30 PROCEDURE — 85610 PROTHROMBIN TIME: CPT | Performed by: STUDENT IN AN ORGANIZED HEALTH CARE EDUCATION/TRAINING PROGRAM

## 2025-07-30 PROCEDURE — 85014 HEMATOCRIT: CPT | Performed by: STUDENT IN AN ORGANIZED HEALTH CARE EDUCATION/TRAINING PROGRAM

## 2025-07-30 PROCEDURE — 85025 COMPLETE CBC W/AUTO DIFF WBC: CPT | Performed by: STUDENT IN AN ORGANIZED HEALTH CARE EDUCATION/TRAINING PROGRAM

## 2025-07-30 PROCEDURE — 74174 CTA ABD&PLVS W/CONTRAST: CPT

## 2025-07-30 PROCEDURE — 86850 RBC ANTIBODY SCREEN: CPT | Performed by: STUDENT IN AN ORGANIZED HEALTH CARE EDUCATION/TRAINING PROGRAM

## 2025-07-30 PROCEDURE — 86900 BLOOD TYPING SEROLOGIC ABO: CPT | Performed by: STUDENT IN AN ORGANIZED HEALTH CARE EDUCATION/TRAINING PROGRAM

## 2025-07-30 PROCEDURE — 86901 BLOOD TYPING SEROLOGIC RH(D): CPT

## 2025-07-30 PROCEDURE — 86900 BLOOD TYPING SEROLOGIC ABO: CPT

## 2025-07-30 PROCEDURE — 86901 BLOOD TYPING SEROLOGIC RH(D): CPT | Performed by: STUDENT IN AN ORGANIZED HEALTH CARE EDUCATION/TRAINING PROGRAM

## 2025-07-30 PROCEDURE — 36415 COLL VENOUS BLD VENIPUNCTURE: CPT

## 2025-07-30 PROCEDURE — 96374 THER/PROPH/DIAG INJ IV PUSH: CPT

## 2025-07-30 PROCEDURE — 25510000001 IOPAMIDOL PER 1 ML: Performed by: STUDENT IN AN ORGANIZED HEALTH CARE EDUCATION/TRAINING PROGRAM

## 2025-07-30 RX ORDER — ONDANSETRON 4 MG/1
4 TABLET, ORALLY DISINTEGRATING ORAL EVERY 6 HOURS PRN
Status: DISCONTINUED | OUTPATIENT
Start: 2025-07-30 | End: 2025-08-01 | Stop reason: HOSPADM

## 2025-07-30 RX ORDER — SODIUM CHLORIDE 9 MG/ML
40 INJECTION, SOLUTION INTRAVENOUS AS NEEDED
Status: DISCONTINUED | OUTPATIENT
Start: 2025-07-30 | End: 2025-08-01 | Stop reason: HOSPADM

## 2025-07-30 RX ORDER — SODIUM CHLORIDE 0.9 % (FLUSH) 0.9 %
10 SYRINGE (ML) INJECTION EVERY 12 HOURS SCHEDULED
Status: DISCONTINUED | OUTPATIENT
Start: 2025-07-31 | End: 2025-08-01 | Stop reason: HOSPADM

## 2025-07-30 RX ORDER — BISACODYL 10 MG
10 SUPPOSITORY, RECTAL RECTAL DAILY PRN
Status: DISCONTINUED | OUTPATIENT
Start: 2025-07-30 | End: 2025-08-01 | Stop reason: HOSPADM

## 2025-07-30 RX ORDER — SODIUM CHLORIDE 0.9 % (FLUSH) 0.9 %
10 SYRINGE (ML) INJECTION AS NEEDED
Status: DISCONTINUED | OUTPATIENT
Start: 2025-07-30 | End: 2025-08-01 | Stop reason: HOSPADM

## 2025-07-30 RX ORDER — PANTOPRAZOLE SODIUM 40 MG/10ML
40 INJECTION, POWDER, LYOPHILIZED, FOR SOLUTION INTRAVENOUS ONCE
Status: COMPLETED | OUTPATIENT
Start: 2025-07-31 | End: 2025-07-30

## 2025-07-30 RX ORDER — PANTOPRAZOLE SODIUM 40 MG/10ML
40 INJECTION, POWDER, LYOPHILIZED, FOR SOLUTION INTRAVENOUS
Status: DISCONTINUED | OUTPATIENT
Start: 2025-07-30 | End: 2025-07-30

## 2025-07-30 RX ORDER — AMOXICILLIN 250 MG
2 CAPSULE ORAL 2 TIMES DAILY PRN
Status: DISCONTINUED | OUTPATIENT
Start: 2025-07-30 | End: 2025-08-01 | Stop reason: HOSPADM

## 2025-07-30 RX ORDER — SODIUM CHLORIDE, SODIUM LACTATE, POTASSIUM CHLORIDE, CALCIUM CHLORIDE 600; 310; 30; 20 MG/100ML; MG/100ML; MG/100ML; MG/100ML
75 INJECTION, SOLUTION INTRAVENOUS CONTINUOUS
Status: ACTIVE | OUTPATIENT
Start: 2025-07-31 | End: 2025-08-01

## 2025-07-30 RX ORDER — IOPAMIDOL 755 MG/ML
100 INJECTION, SOLUTION INTRAVASCULAR
Status: COMPLETED | OUTPATIENT
Start: 2025-07-30 | End: 2025-07-30

## 2025-07-30 RX ORDER — POLYETHYLENE GLYCOL 3350 17 G/17G
17 POWDER, FOR SOLUTION ORAL DAILY PRN
Status: DISCONTINUED | OUTPATIENT
Start: 2025-07-30 | End: 2025-08-01 | Stop reason: HOSPADM

## 2025-07-30 RX ORDER — ONDANSETRON 2 MG/ML
4 INJECTION INTRAMUSCULAR; INTRAVENOUS EVERY 6 HOURS PRN
Status: DISCONTINUED | OUTPATIENT
Start: 2025-07-30 | End: 2025-08-01 | Stop reason: HOSPADM

## 2025-07-30 RX ORDER — BISACODYL 5 MG/1
5 TABLET, DELAYED RELEASE ORAL DAILY PRN
Status: DISCONTINUED | OUTPATIENT
Start: 2025-07-30 | End: 2025-08-01 | Stop reason: HOSPADM

## 2025-07-30 RX ADMIN — IOPAMIDOL 100 ML: 755 INJECTION, SOLUTION INTRAVENOUS at 22:10

## 2025-07-30 RX ADMIN — SODIUM CHLORIDE, POTASSIUM CHLORIDE, SODIUM LACTATE AND CALCIUM CHLORIDE 500 ML: 600; 310; 30; 20 INJECTION, SOLUTION INTRAVENOUS at 23:57

## 2025-07-30 RX ADMIN — PANTOPRAZOLE SODIUM 40 MG: 40 INJECTION, POWDER, FOR SOLUTION INTRAVENOUS at 23:55

## 2025-07-30 NOTE — LETTER
Date:       Patient:    Date of Birth:    Date of Visit:        To Whom It May Concern:    It is my medical opinion that            Sincerely,          CC: No Recipients

## 2025-07-31 ENCOUNTER — ANESTHESIA EVENT (OUTPATIENT)
Dept: PERIOP | Facility: HOSPITAL | Age: 70
End: 2025-07-31
Payer: MEDICARE

## 2025-07-31 ENCOUNTER — ANESTHESIA (OUTPATIENT)
Dept: PERIOP | Facility: HOSPITAL | Age: 70
End: 2025-07-31
Payer: MEDICARE

## 2025-07-31 LAB
ANION GAP SERPL CALCULATED.3IONS-SCNC: 8.7 MMOL/L (ref 5–15)
BUN SERPL-MCNC: 16.5 MG/DL (ref 8–23)
BUN/CREAT SERPL: 19.6 (ref 7–25)
CALCIUM SPEC-SCNC: 8.9 MG/DL (ref 8.6–10.5)
CHLORIDE SERPL-SCNC: 106 MMOL/L (ref 98–107)
CO2 SERPL-SCNC: 26.3 MMOL/L (ref 22–29)
CREAT SERPL-MCNC: 0.84 MG/DL (ref 0.76–1.27)
DEPRECATED RDW RBC AUTO: 44.8 FL (ref 37–54)
EGFRCR SERPLBLD CKD-EPI 2021: 95 ML/MIN/1.73
ERYTHROCYTE [DISTWIDTH] IN BLOOD BY AUTOMATED COUNT: 13.3 % (ref 12.3–15.4)
GLUCOSE SERPL-MCNC: 101 MG/DL (ref 65–99)
HCT VFR BLD AUTO: 36 % (ref 37.5–51)
HCT VFR BLD AUTO: 38.1 % (ref 37.5–51)
HCT VFR BLD AUTO: 38.9 % (ref 37.5–51)
HGB BLD-MCNC: 11.7 G/DL (ref 13–17.7)
HGB BLD-MCNC: 12.5 G/DL (ref 13–17.7)
HGB BLD-MCNC: 12.7 G/DL (ref 13–17.7)
MCH RBC QN AUTO: 30.1 PG (ref 26.6–33)
MCHC RBC AUTO-ENTMCNC: 32.8 G/DL (ref 31.5–35.7)
MCV RBC AUTO: 91.8 FL (ref 79–97)
PLATELET # BLD AUTO: 300 10*3/MM3 (ref 140–450)
PMV BLD AUTO: 11.2 FL (ref 6–12)
POTASSIUM SERPL-SCNC: 4.2 MMOL/L (ref 3.5–5.2)
RBC # BLD AUTO: 4.15 10*6/MM3 (ref 4.14–5.8)
SODIUM SERPL-SCNC: 141 MMOL/L (ref 136–145)
WBC NRBC COR # BLD AUTO: 7.72 10*3/MM3 (ref 3.4–10.8)

## 2025-07-31 PROCEDURE — 94761 N-INVAS EAR/PLS OXIMETRY MLT: CPT

## 2025-07-31 PROCEDURE — 25010000002 LIDOCAINE 2% SOLUTION: Performed by: NURSE ANESTHETIST, CERTIFIED REGISTERED

## 2025-07-31 PROCEDURE — 80048 BASIC METABOLIC PNL TOTAL CA: CPT | Performed by: STUDENT IN AN ORGANIZED HEALTH CARE EDUCATION/TRAINING PROGRAM

## 2025-07-31 PROCEDURE — 99204 OFFICE O/P NEW MOD 45 MIN: CPT | Performed by: STUDENT IN AN ORGANIZED HEALTH CARE EDUCATION/TRAINING PROGRAM

## 2025-07-31 PROCEDURE — G0378 HOSPITAL OBSERVATION PER HR: HCPCS

## 2025-07-31 PROCEDURE — 25810000003 LACTATED RINGERS PER 1000 ML: Performed by: STUDENT IN AN ORGANIZED HEALTH CARE EDUCATION/TRAINING PROGRAM

## 2025-07-31 PROCEDURE — 85014 HEMATOCRIT: CPT | Performed by: HOSPITALIST

## 2025-07-31 PROCEDURE — 25010000002 PROPOFOL 10 MG/ML EMULSION: Performed by: NURSE ANESTHETIST, CERTIFIED REGISTERED

## 2025-07-31 PROCEDURE — 87338 HPYLORI STOOL AG IA: CPT | Performed by: STUDENT IN AN ORGANIZED HEALTH CARE EDUCATION/TRAINING PROGRAM

## 2025-07-31 PROCEDURE — 43235 EGD DIAGNOSTIC BRUSH WASH: CPT | Performed by: STUDENT IN AN ORGANIZED HEALTH CARE EDUCATION/TRAINING PROGRAM

## 2025-07-31 PROCEDURE — 96361 HYDRATE IV INFUSION ADD-ON: CPT

## 2025-07-31 PROCEDURE — 99223 1ST HOSP IP/OBS HIGH 75: CPT | Performed by: STUDENT IN AN ORGANIZED HEALTH CARE EDUCATION/TRAINING PROGRAM

## 2025-07-31 PROCEDURE — 85027 COMPLETE CBC AUTOMATED: CPT | Performed by: STUDENT IN AN ORGANIZED HEALTH CARE EDUCATION/TRAINING PROGRAM

## 2025-07-31 PROCEDURE — 85018 HEMOGLOBIN: CPT | Performed by: HOSPITALIST

## 2025-07-31 RX ORDER — NITROGLYCERIN 0.4 MG/1
0.4 TABLET SUBLINGUAL
Status: DISCONTINUED | OUTPATIENT
Start: 2025-07-31 | End: 2025-08-01 | Stop reason: HOSPADM

## 2025-07-31 RX ORDER — SODIUM CHLORIDE, SODIUM LACTATE, POTASSIUM CHLORIDE, CALCIUM CHLORIDE 600; 310; 30; 20 MG/100ML; MG/100ML; MG/100ML; MG/100ML
100 INJECTION, SOLUTION INTRAVENOUS ONCE
Status: DISCONTINUED | OUTPATIENT
Start: 2025-07-31 | End: 2025-07-31 | Stop reason: HOSPADM

## 2025-07-31 RX ORDER — SODIUM PHOSPHATE,MONO-DIBASIC 19G-7G/118
1 ENEMA (ML) RECTAL 2 TIMES DAILY WITH MEALS
COMMUNITY

## 2025-07-31 RX ORDER — LIDOCAINE HYDROCHLORIDE 20 MG/ML
INJECTION, SOLUTION INFILTRATION; PERINEURAL AS NEEDED
Status: DISCONTINUED | OUTPATIENT
Start: 2025-07-31 | End: 2025-07-31 | Stop reason: SURG

## 2025-07-31 RX ORDER — MULTIPLE VITAMINS W/ MINERALS TAB 9MG-400MCG
1 TAB ORAL DAILY
COMMUNITY

## 2025-07-31 RX ORDER — PANTOPRAZOLE SODIUM 40 MG/1
40 TABLET, DELAYED RELEASE ORAL
Status: DISCONTINUED | OUTPATIENT
Start: 2025-08-01 | End: 2025-08-01 | Stop reason: HOSPADM

## 2025-07-31 RX ORDER — TRIAMCINOLONE ACETONIDE 1 MG/G
1 CREAM TOPICAL NIGHTLY
COMMUNITY
Start: 2025-05-14

## 2025-07-31 RX ORDER — PROPOFOL 10 MG/ML
VIAL (ML) INTRAVENOUS AS NEEDED
Status: DISCONTINUED | OUTPATIENT
Start: 2025-07-31 | End: 2025-07-31 | Stop reason: SURG

## 2025-07-31 RX ORDER — ONDANSETRON 2 MG/ML
4 INJECTION INTRAMUSCULAR; INTRAVENOUS ONCE AS NEEDED
Status: DISCONTINUED | OUTPATIENT
Start: 2025-07-31 | End: 2025-07-31 | Stop reason: HOSPADM

## 2025-07-31 RX ADMIN — Medication 10 ML: at 01:10

## 2025-07-31 RX ADMIN — Medication 10 ML: at 20:51

## 2025-07-31 RX ADMIN — SODIUM CHLORIDE, POTASSIUM CHLORIDE, SODIUM LACTATE AND CALCIUM CHLORIDE 75 ML/HR: 600; 310; 30; 20 INJECTION, SOLUTION INTRAVENOUS at 07:38

## 2025-07-31 RX ADMIN — LIDOCAINE HYDROCHLORIDE 100 MG: 20 INJECTION, SOLUTION INFILTRATION; PERINEURAL at 12:19

## 2025-07-31 RX ADMIN — SODIUM CHLORIDE, POTASSIUM CHLORIDE, SODIUM LACTATE AND CALCIUM CHLORIDE 75 ML/HR: 600; 310; 30; 20 INJECTION, SOLUTION INTRAVENOUS at 01:05

## 2025-07-31 RX ADMIN — PROPOFOL 75 MG: 10 INJECTION, EMULSION INTRAVENOUS at 12:23

## 2025-07-31 RX ADMIN — Medication 10 ML: at 08:59

## 2025-07-31 NOTE — ANESTHESIA PREPROCEDURE EVALUATION
Anesthesia Evaluation     Patient summary reviewed and Nursing notes reviewed   no history of anesthetic complications:   NPO Solid Status: > 8 hours  NPO Liquid Status: > 8 hours           Airway   Mallampati: II  TM distance: >3 FB  Neck ROM: full  No difficulty expected  Dental      Pulmonary     breath sounds clear to auscultation  (+) ,sleep apnea (does not use cpap with get inspire)  Cardiovascular - negative cardio ROS  Exercise tolerance: good (4-7 METS)    ECG reviewed  Rhythm: regular  Rate: normal      ROS comment: HEART RATE=80  bpm  RR Jwwejsuy=544  ms  TX Jovgdttb=836  ms  P Horizontal Axis=14  deg  P Front Axis=48  deg  QRSD Interval=97  ms  QT Tkcmmbtm=496  ms  YDkX=484  ms  QRS Axis=20  deg  T Wave Axis=  deg  - BORDERLINE ECG -  Sinus rhythm  Borderline  T abnormalities, lateral leads  When compared with ECG of 20-Feb-2021 13:42:06,  No significant change  Electronically Signed By: Cole Tavarez ([facility]) 2025-06-14 12:25:07  Date and Time of Study:2025-06-13 21:30:20      Neuro/Psych  (+) seizures (epilepsy and medicated in 1987 last sz was when he was 19yo), psychiatric history Anxiety, poor historian (memory issues).  GI/Hepatic/Renal/Endo    (+) GERD poorly controlled    ROS Comment: Márquez esophagus    Musculoskeletal (-) negative ROS    Abdominal    Substance History - negative use     OB/GYN          Other - negative ROS                     Anesthesia Plan    ASA 2     MAC     intravenous induction     Anesthetic plan, risks, benefits, and alternatives have been provided, discussed and informed consent has been obtained with: patient.    Use of blood products discussed with patient  Consented to blood products.      CODE STATUS:    Code Status (Patient has no pulse and is not breathing): CPR (Attempt to Resuscitate)  Medical Interventions (Patient has pulse or is breathing): Full Support

## 2025-07-31 NOTE — ED PROVIDER NOTES
Subjective   History of Present Illness  70-year-old male with no significant past medical history other than Márquez's esophagus and hemorrhoids presenting with complaint that he has dark stools.  He said to episodes of dark stools with large volume prior to arrival today.   he states that he thought it was a hemorrhoid bleed but he has no pain, states that he has never had any bright red blood.  He also and has no history of diverticulosis.  No significant abdominal pain but he has intermittent abdominal cramping and pain in the epigastric region.  No history of GERD or ulcers.        Review of Systems    Past Medical History:   Diagnosis Date    Márquez esophagus     Bleeding from the nose     GERD (gastroesophageal reflux disease)        No Known Allergies    Past Surgical History:   Procedure Laterality Date    COLONOSCOPY      HERNIA REPAIR      TONSILLECTOMY         No family history on file.    Social History     Socioeconomic History    Marital status: Single   Tobacco Use    Smoking status: Never    Smokeless tobacco: Never   Vaping Use    Vaping status: Never Used   Substance and Sexual Activity    Alcohol use: No    Drug use: No    Sexual activity: Not Currently           Objective   Physical Exam  Vitals and nursing note reviewed. Exam conducted with a chaperone present.   Constitutional:       General: He is not in acute distress.     Appearance: Normal appearance. He is not ill-appearing, toxic-appearing or diaphoretic.   HENT:      Head: Normocephalic and atraumatic.      Nose: Nose normal.      Mouth/Throat:      Pharynx: No oropharyngeal exudate or posterior oropharyngeal erythema.   Eyes:      Extraocular Movements: Extraocular movements intact.      Conjunctiva/sclera: Conjunctivae normal.      Pupils: Pupils are equal, round, and reactive to light.   Cardiovascular:      Rate and Rhythm: Normal rate and regular rhythm.   Pulmonary:      Effort: Pulmonary effort is normal. No respiratory  distress.      Breath sounds: Normal breath sounds. No stridor. No wheezing, rhonchi or rales.   Abdominal:      General: Abdomen is flat. There is no distension.      Tenderness: There is abdominal tenderness (Mild epigastric). There is no guarding or rebound.   Genitourinary:     Comments: Negative for hemorrhoids, there is a skin tag at the 5 o'clock position, there is evidence of dark stool that is dried around the perianal orifice  Musculoskeletal:         General: No swelling, tenderness, deformity or signs of injury. Normal range of motion.      Cervical back: Normal range of motion. No rigidity.   Skin:     General: Skin is warm and dry.      Capillary Refill: Capillary refill takes less than 2 seconds.      Findings: No erythema or rash.   Neurological:      General: No focal deficit present.      Mental Status: He is alert and oriented to person, place, and time. Mental status is at baseline.      Cranial Nerves: No cranial nerve deficit.      Sensory: No sensory deficit.      Motor: No weakness.   Psychiatric:         Mood and Affect: Mood normal.         Procedures           ED Course                                                       Medical Decision Making  Patient here for evaluation of dark stools found to have 2 large volume dark stool so the patient had a CT angiogram ordered which showed contrast blush in the upper GI tract.  This is concerning for an active GI bleed however the patient technically has a Glascow Blatchford score of 0.  Had shared decision making with the patient regarding admission as he lives at home alone and patient was agreeable to stay at home.  Discussed with hospitalist possibly consultation for GI for an EGD given concerning CT findings which were somewhat indeterminant.  While patient was here no evidence of active exsanguination pressure stable nontachycardic and stable for admission to the hospital    Problems Addressed:  Dark stools: complicated acute illness or  injury  History of hemorrhoids: complicated acute illness or injury    Amount and/or Complexity of Data Reviewed  Labs: ordered.  Radiology: ordered.    Risk  Prescription drug management.  Decision regarding hospitalization.        Final diagnoses:   Dark stools   History of hemorrhoids       ED Disposition  ED Disposition       ED Disposition   Decision to Admit    Condition   --    Comment   Level of Care: Telemetry [5]   Diagnosis: Dark stools [[id number]]   Admitting Physician: CARLOS CARVAJAL [[id number]]                 No follow-up provider specified.       Medication List      No changes were made to your prescriptions during this visit.            Gualberto Jones MD  07/30/25 [id number]

## 2025-07-31 NOTE — PROGRESS NOTES
Non-Billable Note:    Mr. Tovar was seen and examined post-procedure.  Staff report he is still passing bloody stools.  He reports some dizziness this afternoon.  Will watch overnight.

## 2025-07-31 NOTE — H&P
[facility]ITAL[facility]     Rusty Carr MD    CHIEF COMPLAINT: Black and bloody stools    HISTORY OF PRESENT ILLNESS:    70-year-old past medical history of Márquez's esophagus not currently on PPI presenting with melena and bright red blood per rectum.  Patient   reports a started 7:30 p.m. and he had 2 episodes at home and then has had 1 episode in the emergency room.  Not on aspirin or any other blood thinners.  Does not take NSAIDs.  Hemoglobin has dropped 2  points without any fluids in the emergency room.  No dizziness, abdominal pain, nausea, vomiting.  Left leg this is never happened to him before.  He has had some anal fissures before where a few drops of blood of came out but nothing like this.      Past Medical History:   Diagnosis Date    Márquez esophagus     Bleeding from the nose     GERD (gastroesophageal reflux disease)      Past Surgical History:   Procedure Laterality Date    COLONOSCOPY      HERNIA REPAIR      TONSILLECTOMY       No family history on file.  Social History     Tobacco Use    Smoking status: Never    Smokeless tobacco: Never   Vaping Use    Vaping status: Never Used   Substance Use Topics    Alcohol use: No    Drug use: No     (Not in a hospital admission)    Allergies:  Patient has no known allergies.    Immunization History   Administered Date(s) Administered    COVID-19 (CHELA) 03/11/2021    COVID-19 (PFIZER) BIVALENT 12+YRS 11/22/2022       REVIEW OF SYSTEMS:  Please see the above history of present illness for pertinent positives and negatives.  The remainder of the patient's systems have been reviewed and are negative.     Vital Signs  Temp:  [97.8 °F (36.6 °C)] 97.8 °F (36.6 °C)  Heart Rate:  [67-78] 67  Resp:  [18] 18  BP: (126-143)/() 126/85         Physical Exam  Vitals and nursing note reviewed.   Constitutional:       General: He is not in acute distress.  Cardiovascular:      Rate and Rhythm: Normal rate and regular rhythm.   Pulmonary:       Effort: Pulmonary effort is normal. No respiratory distress.   Abdominal:      General: Abdomen is flat. There is no distension.      Tenderness: There is no abdominal tenderness.   Musculoskeletal:         General: No swelling or deformity.   Skin:     General: Skin is warm and dry.   Neurological:      General: No focal deficit present.      Mental Status: He is alert. Mental status is at baseline.              Results Review:    I reviewed the patient's new clinical results.  Lab Results (most recent)       Procedure Component Value Units Date/Time    Hemoglobin & Hematocrit, Blood [[id number]]  (Abnormal) Collected: 07/30/25 2336    Specimen: Blood Updated: 07/30/25 2355     Hemoglobin 11.4 g/dL      Hematocrit 35.1 %     Protime-INR [[id number]]  (Normal) Collected: 07/30/25 2115    Specimen: Blood Updated: 07/30/25 2154     Protime 14.0 Seconds      INR 1.05    Narrative:      Therapeutic Ranges for INR: 2.0-3.0 (PT 20-30)                              2.5-3.5 (PT 25-34)    Comprehensive Metabolic Panel [[id number]] Collected: 07/30/25 2052    Specimen: Blood Updated: 07/30/25 2125     Glucose 97 mg/dL      BUN 18.7 mg/dL      Creatinine 0.83 mg/dL      Sodium 142 mmol/L      Potassium 4.4 mmol/L      Chloride 107 mmol/L      CO2 25.1 mmol/L      Calcium 9.5 mg/dL      Total Protein 6.8 g/dL      Albumin 4.1 g/dL      ALT (SGPT) 17 U/L      AST (SGOT) 15 U/L      Alkaline Phosphatase 84 U/L      Total Bilirubin 0.2 mg/dL      Globulin 2.7 gm/dL      A/G Ratio 1.5 g/dL      BUN/Creatinine Ratio 22.5     Anion Gap 9.9 mmol/L      eGFR 95.3 mL/min/1.73     Narrative:      GFR Categories in Chronic Kidney Disease (CKD)              GFR Category          GFR (mL/min/1.73)    Interpretation  G1                    90 or greater        Normal or high (1)  G2                    60-89                Mild decrease (1)  G3a                   45-59                Mild to moderate decrease  G3b                   30-44                 Moderate to severe decrease  G4                    15-29                Severe decrease  G5                    14 or less           Kidney failure    (1)In the absence of evidence of kidney disease, neither GFR category G1 or G2 fulfill the criteria for CKD.    eGFR calculation 2021 CKD-EPI creatinine equation, which does not include race as a factor    Lipase [[id number]]  (Normal) Collected: 07/30/25 2052    Specimen: Blood Updated: 07/30/25 2125     Lipase 47 U/L     CBC & Differential [[id number]]  (Abnormal) Collected: 07/30/25 2052    Specimen: Blood Updated: 07/30/25 2059    Narrative:      The following orders were created for panel order CBC & Differential.  Procedure                               Abnormality         Status                     ---------                               -----------         ------                     CBC Auto Differential[[id number]]        Abnormal            Final result                 Please view results for these tests on the individual orders.    CBC Auto Differential [[id number]]  (Abnormal) Collected: 07/30/25 2052    Specimen: Blood Updated: 07/30/25 2059     WBC 6.12 10*3/mm3      RBC 4.38 10*6/mm3      Hemoglobin 13.4 g/dL      Hematocrit 39.8 %      MCV 90.9 fL      MCH 30.6 pg      MCHC 33.7 g/dL      RDW 13.3 %      RDW-SD 43.8 fl      MPV 10.6 fL      Platelets 318 10*3/mm3      Neutrophil % 44.3 %      Lymphocyte % 36.9 %      Monocyte % 9.6 %      Eosinophil % 7.2 %      Basophil % 1.8 %      Immature Grans % 0.2 %      Neutrophils, Absolute 2.71 10*3/mm3      Lymphocytes, Absolute 2.26 10*3/mm3      Monocytes, Absolute 0.59 10*3/mm3      Eosinophils, Absolute 0.44 10*3/mm3      Basophils, Absolute 0.11 10*3/mm3      Immature Grans, Absolute 0.01 10*3/mm3      nRBC 0.0 /100 WBC             Imaging Results (Most Recent)       Procedure Component Value Units Date/Time    CT Angiogram Abdomen Pelvis [[id number]] Collected: 07/30/25 2232     Updated: 07/30/25 2305     Narrative:      CT ANGIOGRAM ABDOMEN PELVIS    Date of Exam: 7/30/2025 10:08 PM EDT    Indication: Dark stool.    Comparison: None available.    Technique: CTA of the abdomen and pelvis was performed before and after the uneventful intravenous administration of iodinated contrast. Reconstructed coronal and sagittal images were also obtained. In addition, a 3-D volume rendered image was created   for interpretation. Automated exposure control and iterative reconstruction methods were used.      Findings:  Vasculature: The thoracic aorta is normal in caliber. The abdominal aorta is normal. The celiac axis, SMA, bilateral renal arteries and LARRY are normal in caliber without evidence of occlusion or significant stenosis. The bilateral common iliac, external   iliac, internal iliac, common femoral and visualized superficial femoral and profunda femoris are patent without hemodynamically significant stenosis    Visualized Chest: Moderate-sized paraesophageal hernia.    Liver: Liver is normal in size and CT density. No focal lesions.    Gallbladder: Cholelithiasis without CT evidence of acute cholecystitis.    Bile Ducts: No billiary dcutal dilation.    Spleen: Spleen is normal in size and CT density.    Pancreas: Pancreas is normal. There is no evidence of pancreatic mass or peripancreatic fluid.    Adrenals: Adrenal glands are unremarkable.    Kidneys: Kidneys are normal in size. There are no stones or hydronephrosis.    Gastrointestinal: Colonic diverticulosis without evidence of acute diverticulitis. There is a small prominence of contrast blush noted within the proximal aspect of the stomach (for example image 43 of series 5).  No other definite evidence of active GI bleed is identified on this study. Mild diffuse colonic wall thickening.    Bladder: The bladder is normal.    Pelvis:  No suspecious mass.    Peritoneum/Mesentery: No fluid collection, ascities, or free air.      Lymph Nodes: No  "lymphadenopathy.    Vasculature: Unremarkable    Abdominal Wall: Right-sided fat-containing inguinal hernia. Otherwise unremarkable    Bony Structures: No acute osseous abnormality. Degenerative changes noted throughout the visualized spine.      Impression:      Impression:  1.Small prominence of contrast blush within the proximal aspect of the stomach, concerning for active GI bleed. Unfortunately given the lack of delayed images, this cannot be accurately confirmed on this study. Alternatively this may represent a small   arterial vessel within the gastric mucosa. Please consider further evaluation with dedicated nuclear medicine GI bleed scan.  2.No other definite evidence of active GI bleed is identified on this study.  3.Mild diffuse colonic wall thickening, suggestive of colitis.  4.Moderate-sized paraesophageal hernia.  5.Cholelithiasis without CT evidence of acute cholecystitis.        Electronically Signed: Efe Foote DO    7/30/2025 11:02 PM EDT    Workstation ID: [id number]          reviewed    ECG/EMG Results (most recent)       None          reviewed    Assessment & Plan     Bright red blood per rectum  Melena  Blood loss anemia  -  IV PPI  - Hemoglobin went from 13.4 to 11.4 in 3 hours without any fluids.  Trend hemoglobin every 8 hours.  Type and screen completed  - CTA with concerning for active GI bleed and stomach  -IV bolus,  and continuous IV fluids  - Consult GI, last EGD done a few years ago and had Márquez's esophagus at [facility]  - NPO     Mixed anxiety and depression  - Hold Paxil for now    I discussed the patient's findings and my recommendations with patient and ED provider    Duke Solorio MD  07/31/25  00:25 EDT       \"Dictated utilizing Dragon dictation\"    Note disclaimer: At [facility], we believe that sharing information builds trust and better relationships. You are receiving this note because you recently visited [facility]. It is possible you will see health " information before a provider has talked with you about it. This kind of information can be easy to misunderstand. To help you fully understand what it means for your health, we urge you to discuss this note with your provider.

## 2025-07-31 NOTE — ED NOTES
Nursing report ED to floor  Devyn Tovar  70 y.o.  male    HPI :  HPI  Stated Reason for Visit: Blood in stool, rectal bleeding  History Obtained From: patient    Chief Complaint  Chief Complaint   Patient presents with    Black or Bloody Stool     Pt reports rectal bleeding, blood in stool starting today.        Admitting doctor:   No admitting provider for patient encounter.    Admitting diagnosis:   There were no encounter diagnoses.    Code status:   Current Code Status       Date Active Code Status Order ID Comments User Context       Not on file            Allergies:   Patient has no known allergies.    Isolation:   No active isolations    Intake and Output  No intake or output data in the 24 hours ending 07/30/25 2331    Weight:   There were no vitals filed for this visit.    Most recent vitals:   Vitals:    07/30/25 2024 07/30/25 2025 07/30/25 2216   BP: (!) 143/109     Pulse: 78  72   Resp: 18     Temp:  97.8 °F (36.6 °C)    TempSrc:  Oral    SpO2: 95%  95%       Active LDAs/IV Access:   Lines, Drains & Airways       Active LDAs       Name Placement date Placement time Site Days    Peripheral IV 07/30/25 2109 20 G Right Antecubital 07/30/25 2109  Antecubital  less than 1                    Labs (abnormal labs have a star):   Labs Reviewed   CBC WITH AUTO DIFFERENTIAL - Abnormal; Notable for the following components:       Result Value    Eosinophil % 7.2 (*)     Basophil % 1.8 (*)     Eosinophils, Absolute 0.44 (*)     All other components within normal limits   LIPASE - Normal   PROTIME-INR - Normal    Narrative:     Therapeutic Ranges for INR: 2.0-3.0 (PT 20-30)                              2.5-3.5 (PT 25-34)   COMPREHENSIVE METABOLIC PANEL    Narrative:     GFR Categories in Chronic Kidney Disease (CKD)              GFR Category          GFR (mL/min/1.73)    Interpretation  G1                    90 or greater        Normal or high (1)  G2                    60-89                Mild decrease (1)  G3a                    45-59                Mild to moderate decrease  G3b                   30-44                Moderate to severe decrease  G4                    15-29                Severe decrease  G5                    14 or less           Kidney failure    (1)In the absence of evidence of kidney disease, neither GFR category G1 or G2 fulfill the criteria for CKD.    eGFR calculation 2021 CKD-EPI creatinine equation, which does not include race as a factor   URINALYSIS W/ MICROSCOPIC IF INDICATED (NO CULTURE)   HEMOGLOBIN AND HEMATOCRIT, BLOOD   TYPE AND SCREEN   ABORH 2ND SPECIMEN VERIFICATION   CBC AND DIFFERENTIAL    Narrative:     The following orders were created for panel order CBC & Differential.  Procedure                               Abnormality         Status                     ---------                               -----------         ------                     CBC Auto Differential[[id number]]        Abnormal            Final result                 Please view results for these tests on the individual orders.       EKG:   No orders to display       Meds given in ED:   Medications   sodium chloride 0.9 % flush 10 mL (has no administration in time range)   iopamidol (ISOVUE-370) 76 % injection 100 mL (100 mL Intravenous Given 7/30/25 2210)       Imaging results:  CT Angiogram Abdomen Pelvis  Result Date: 7/30/2025  Impression: 1.Small prominence of contrast blush within the proximal aspect of the stomach, concerning for active GI bleed. Unfortunately given the lack of delayed images, this cannot be accurately confirmed on this study. Alternatively this may represent a small arterial vessel within the gastric mucosa. Please consider further evaluation with dedicated nuclear medicine GI bleed scan. 2.No other definite evidence of active GI bleed is identified on this study. 3.Mild diffuse colonic wall thickening, suggestive of colitis. 4.Moderate-sized paraesophageal hernia. 5.Cholelithiasis without CT  evidence of acute cholecystitis. Electronically Signed: Efe Foote DO  7/30/2025 11:02 PM EDT  Workstation ID: [id number]      Ambulatory status:   - 1 assist    Social issues:   Social History     Socioeconomic History    Marital status: Single   Tobacco Use    Smoking status: Never    Smokeless tobacco: Never   Vaping Use    Vaping status: Never Used   Substance and Sexual Activity    Alcohol use: No    Drug use: No    Sexual activity: Not Currently       Peripheral Neurovascular       Neuro Cognitive       Learning       Respiratory       Abdominal Pain       Pain Assessments       NIH Stroke Scale       Des Dick RN  07/30/25 23:31 EDT

## 2025-07-31 NOTE — PLAN OF CARE
Goal Outcome Evaluation:  Plan of Care Reviewed With: patient        Progress: no change  Outcome Evaluation: vss. EGD completed today. currently on 2L O2 n.c. following episode of feeling light headed with standing. repeat H&H ordered. MD notified of lab results. tele in place, nsr. pt tolerated regular diet. continiues with red/dark liquid bowel movements. pt currently resting in room. denies pain/n/v

## 2025-07-31 NOTE — CONSULTS
Referring Provider: Dr. Ramírez   Reason for Consultation: Melena     Patient Care Team:  Rusty Carr MD as PCP - General  Rusty Carr MD as PCP - Family Medicine    Chief complaint Melena     Subjective .     History of present illness:      69 yo M with PMH of GERD, BE + noted below who presents with melenic stools starting yesterday. GI consulted for further management.     Denies NSAID or anticoagulation use. Denies abdominal pain. Reports his last EGD was 2 years ago at [facility].     Review of Systems  Pertinent items are noted in HPI    History  Past Medical History:   Diagnosis Date    Márquez esophagus     Bleeding from the nose     GERD (gastroesophageal reflux disease)     Psoriasis (a type of skin inflammation)    ,   Past Surgical History:   Procedure Laterality Date    COLONOSCOPY      HERNIA REPAIR      TONSILLECTOMY     , History reviewed. No pertinent family history.,   Social History     Socioeconomic History    Marital status: Single   Tobacco Use    Smoking status: Never    Smokeless tobacco: Never   Vaping Use    Vaping status: Never Used   Substance and Sexual Activity    Alcohol use: No    Drug use: No    Sexual activity: Not Currently     E-cigarette/Vaping    E-cigarette/Vaping Use Never User      E-cigarette/Vaping Substances     E-cigarette/Vaping Devices         ,   Medications Prior to Admission   Medication Sig Dispense Refill Last Dose/Taking    fluticasone-salmeterol (Advair Diskus) 100-50 MCG/DOSE DISKUS INHALE 1 PUFF BY MOUTH EVERY TWELVE HOURS, RINSE MOUTH AFTER EACH USE   7/30/2025 at  9:00 AM    glucosamine-chondroitin 500-400 MG capsule capsule Take 1 capsule by mouth 2 (Two) Times a Day With Meals.   7/30/2025 Evening    multivitamin with minerals tablet tablet Take 1 tablet by mouth Daily.   7/30/2025 at  9:00 AM    PARoxetine (PAXIL) 10 MG tablet Take 1 tablet by mouth Every Morning.   7/30/2025 at  9:00 AM    Risankizumab-rzaa (SKYRIZI PEN SC) Inject  under the skin into  the appropriate area as directed. Patient unsure of dosage   5/18/2025    triamcinolone (KENALOG) 0.1 % cream Apply 1 Application topically to the appropriate area as directed Every Night. Apply to penis   Past Week    clobetasol (TEMOVATE) 0.05 % external solution APPLY TO AFFECTED AREA OF SCALP 2 TIMES A DAY FOR UP TO 2 WEEKS       fluticasone (FLONASE) 50 MCG/ACT nasal spray instill 2 sprays in each nostril daily       ketoconazole (NIZORAL) 2 % shampoo APPLY TO AFFECTED AREA OF SCALP 2 TIMES PER WEEK, RINSE AFTER 5 TO 10 MINUTES      , Scheduled Meds:  sodium chloride, 10 mL, Intravenous, Q12H   , Continuous Infusions:  lactated ringers, 75 mL/hr, Last Rate: 125 mL/hr (07/31/25 1216)   , PRN Meds:    senna-docusate sodium **AND** polyethylene glycol **AND** bisacodyl **AND** bisacodyl    nitroglycerin    ondansetron ODT **OR** ondansetron    [COMPLETED] Insert Peripheral IV **AND** sodium chloride    sodium chloride    sodium chloride, and Allergies:  Lithium and Erythromycin    Objective     Vital Signs   Temp:  [97.4 °F (36.3 °C)-98.7 °F (37.1 °C)] 97.8 °F (36.6 °C)  Heart Rate:  [67-78] 74  Resp:  [16-20] 20  BP: (120-143)/() 120/95    Physical Exam:   General Appearance alert and cooperative  Head normocephalic, without obvious abnormality and atraumatic  Eyes lids and lashes normal and conjunctivae and sclerae normal  Lungs respirations regular, respirations even, and respirations unlabored  Heart regular rhythm & normal rate and normal S1, S2  Abdomen normal bowel sounds, soft non-tender, no guarding, and no rebound tenderness  Neurologic Mental Status orientated to person, place, time and situation    Results Review:   I reviewed the patient's new clinical results.  I reviewed the patient's new imaging results and agree with the interpretation.      Assessment & Plan       Dark stools      69 yo M with PMH of GERD, BE + noted below who presents with melenic stools starting yesterday. GI consulted  for further management.     # Melena   - Continue Protonix 40 mg daily   - Schedule EGD to further evaluate   - Obtain H Pylori stool Ag     I discussed the patients findings and my recommendations with patient    Banadr Donohue MD  07/31/25  12:30 EDT

## 2025-07-31 NOTE — ANESTHESIA POSTPROCEDURE EVALUATION
Patient: Devyn Tovar    Procedure Summary       Date: 07/31/25 Room / Location: [facility] ENDOSCOPY 2 /  [facility]    Anesthesia Start: 1216 Anesthesia Stop: 1231    Procedure: ESOPHAGOGASTRODUODENOSCOPY (Esophagus) Diagnosis:       Dark stools      (Dark stools [R19.5])    Surgeons: Bandar Donohue MD Provider: Elisa Pacheco CRNA    Anesthesia Type: MAC ASA Status: 2            Anesthesia Type: MAC    Vitals  Vitals Value Taken Time   /85 07/31/25 13:00   Temp 97.8 °F (36.6 °C) 07/31/25 12:32   Pulse 73 07/31/25 13:01   Resp 18 07/31/25 12:50   SpO2 93 % 07/31/25 13:01   Vitals shown include unfiled device data.        Post Anesthesia Care and Evaluation    Patient location during evaluation: PHASE II  Patient participation: complete - patient participated  Level of consciousness: awake  Pain management: adequate    Airway patency: patent  Anesthetic complications: No anesthetic complications  PONV Status: none  Cardiovascular status: acceptable  Respiratory status: acceptable  Hydration status: acceptable

## 2025-07-31 NOTE — H&P
Patient Care Team:  Rusty Carr MD as PCP - General  Rusty Carr MD as PCP - Family Medicine    CHIEF COMPLAINT: Melena    HISTORY OF PRESENT ILLNESS:  Melena    Past Medical History:   Diagnosis Date    Márquez esophagus     Bleeding from the nose     GERD (gastroesophageal reflux disease)     Psoriasis (a type of skin inflammation)      Past Surgical History:   Procedure Laterality Date    COLONOSCOPY      HERNIA REPAIR      TONSILLECTOMY       History reviewed. No pertinent family history.  Social History     Tobacco Use    Smoking status: Never    Smokeless tobacco: Never   Vaping Use    Vaping status: Never Used   Substance Use Topics    Alcohol use: No    Drug use: No     Medications Prior to Admission   Medication Sig Dispense Refill Last Dose/Taking    fluticasone-salmeterol (Advair Diskus) 100-50 MCG/DOSE DISKUS INHALE 1 PUFF BY MOUTH EVERY TWELVE HOURS, RINSE MOUTH AFTER EACH USE   7/30/2025 at  9:00 AM    glucosamine-chondroitin 500-400 MG capsule capsule Take 1 capsule by mouth 2 (Two) Times a Day With Meals.   7/30/2025 Evening    multivitamin with minerals tablet tablet Take 1 tablet by mouth Daily.   7/30/2025 at  9:00 AM    PARoxetine (PAXIL) 10 MG tablet Take 1 tablet by mouth Every Morning.   7/30/2025 at  9:00 AM    Risankizumab-rzaa (SKYRIZI PEN SC) Inject  under the skin into the appropriate area as directed. Patient unsure of dosage   5/18/2025    triamcinolone (KENALOG) 0.1 % cream Apply 1 Application topically to the appropriate area as directed Every Night. Apply to penis   Past Week    clobetasol (TEMOVATE) 0.05 % external solution APPLY TO AFFECTED AREA OF SCALP 2 TIMES A DAY FOR UP TO 2 WEEKS       fluticasone (FLONASE) 50 MCG/ACT nasal spray instill 2 sprays in each nostril daily       ketoconazole (NIZORAL) 2 % shampoo APPLY TO AFFECTED AREA OF SCALP 2 TIMES PER WEEK, RINSE AFTER 5 TO 10 MINUTES        Allergies:  Lithium and Erythromycin    REVIEW OF SYSTEMS:  Please see the  "above history of present illness for pertinent positives and negatives.  The remainder of the patient's systems have been reviewed and are negative.     Vital Signs  Temp:  [97.4 °F (36.3 °C)-98.7 °F (37.1 °C)] 97.8 °F (36.6 °C)  Heart Rate:  [67-78] 74  Resp:  [16-20] 20  BP: (120-143)/() 120/95    Flowsheet Rows      Flowsheet Row First Filed Value   Admission Height 172.7 cm (67.99\") Documented at 07/31/2025 0108   Admission Weight 83.5 kg (184 lb 1.6 oz) Documented at 07/31/2025 0049             Physical Exam:  Physical Exam   Constitutional: Patient appears well-developed and well-nourished and in no acute distress   HEENT:   Head: Normocephalic and atraumatic.   Eyes:  Pupils are equal, round, and reactive to light. EOM are intact. Sclerae are anicteric and non-injected.  Mouth and Throat: Patient has moist mucous membranes. Oropharynx is clear of any erythema or exudate.     Neck: Neck supple. No JVD present. No thyromegaly present. No lymphadenopathy present.  Cardiovascular: Regular rate, regular rhythm, S1 normal and S2 normal.  Exam reveals no gallop and no friction rub.  No murmur heard.  Pulmonary/Chest: Lungs are clear to auscultation bilaterally. No respiratory distress. No wheezes. No rhonchi. No rales.   Abdominal: Soft. Bowel sounds are normal. No distension and no mass. There is no hepatosplenomegaly. There is no tenderness.   Musculoskeletal: Normal Muscle tone  Extremities: No edema. Pulses are palpable in all 4 extremities.  Neurological: Patient is alert and oriented to person, place, and time. Cranial nerves II-XII are grossly intact with no focal deficits.  Skin: Skin is warm. No rash noted. Nails show no clubbing.  No cyanosis or erythema.    Debilities/Disabilities Identified: None  Emotional Behavior: Appropriate     Results Review:   I reviewed the patient's new clinical results.    Lab Results (most recent)       Procedure Component Value Units Date/Time    Hemoglobin & " Hematocrit, Blood [[id number]]  (Abnormal) Collected: 07/31/25 0939    Specimen: Blood Updated: 07/31/25 0944     Hemoglobin 12.7 g/dL      Hematocrit 38.9 %     Basic Metabolic Panel [[id number]]  (Abnormal) Collected: 07/31/25 0341    Specimen: Blood Updated: 07/31/25 0500     Glucose 101 mg/dL      BUN 16.5 mg/dL      Creatinine 0.84 mg/dL      Sodium 141 mmol/L      Potassium 4.2 mmol/L      Chloride 106 mmol/L      CO2 26.3 mmol/L      Calcium 8.9 mg/dL      BUN/Creatinine Ratio 19.6     Anion Gap 8.7 mmol/L      eGFR 95.0 mL/min/1.73     Narrative:      GFR Categories in Chronic Kidney Disease (CKD)              GFR Category          GFR (mL/min/1.73)    Interpretation  G1                    90 or greater        Normal or high (1)  G2                    60-89                Mild decrease (1)  G3a                   45-59                Mild to moderate decrease  G3b                   30-44                Moderate to severe decrease  G4                    15-29                Severe decrease  G5                    14 or less           Kidney failure    (1)In the absence of evidence of kidney disease, neither GFR category G1 or G2 fulfill the criteria for CKD.    eGFR calculation 2021 CKD-EPI creatinine equation, which does not include race as a factor    CBC (No Diff) [[id number]]  (Abnormal) Collected: 07/31/25 0340    Specimen: Blood Updated: 07/31/25 0437     WBC 7.72 10*3/mm3      RBC 4.15 10*6/mm3      Hemoglobin 12.5 g/dL      Hematocrit 38.1 %      MCV 91.8 fL      MCH 30.1 pg      MCHC 32.8 g/dL      RDW 13.3 %      RDW-SD 44.8 fl      MPV 11.2 fL      Platelets 300 10*3/mm3     Hemoglobin & Hematocrit, Blood [[id number]]  (Abnormal) Collected: 07/30/25 2336    Specimen: Blood Updated: 07/30/25 2355     Hemoglobin 11.4 g/dL      Hematocrit 35.1 %     Protime-INR [[id number]]  (Normal) Collected: 07/30/25 2115    Specimen: Blood Updated: 07/30/25 2154     Protime 14.0 Seconds      INR 1.05    Narrative:       Therapeutic Ranges for INR: 2.0-3.0 (PT 20-30)                              2.5-3.5 (PT 25-34)    Comprehensive Metabolic Panel [[id number]] Collected: 07/30/25 2052    Specimen: Blood Updated: 07/30/25 2125     Glucose 97 mg/dL      BUN 18.7 mg/dL      Creatinine 0.83 mg/dL      Sodium 142 mmol/L      Potassium 4.4 mmol/L      Chloride 107 mmol/L      CO2 25.1 mmol/L      Calcium 9.5 mg/dL      Total Protein 6.8 g/dL      Albumin 4.1 g/dL      ALT (SGPT) 17 U/L      AST (SGOT) 15 U/L      Alkaline Phosphatase 84 U/L      Total Bilirubin 0.2 mg/dL      Globulin 2.7 gm/dL      A/G Ratio 1.5 g/dL      BUN/Creatinine Ratio 22.5     Anion Gap 9.9 mmol/L      eGFR 95.3 mL/min/1.73     Narrative:      GFR Categories in Chronic Kidney Disease (CKD)              GFR Category          GFR (mL/min/1.73)    Interpretation  G1                    90 or greater        Normal or high (1)  G2                    60-89                Mild decrease (1)  G3a                   45-59                Mild to moderate decrease  G3b                   30-44                Moderate to severe decrease  G4                    15-29                Severe decrease  G5                    14 or less           Kidney failure    (1)In the absence of evidence of kidney disease, neither GFR category G1 or G2 fulfill the criteria for CKD.    eGFR calculation 2021 CKD-EPI creatinine equation, which does not include race as a factor    Lipase [[id number]]  (Normal) Collected: 07/30/25 2052    Specimen: Blood Updated: 07/30/25 2125     Lipase 47 U/L     CBC & Differential [[id number]]  (Abnormal) Collected: 07/30/25 2052    Specimen: Blood Updated: 07/30/25 2059    Narrative:      The following orders were created for panel order CBC & Differential.  Procedure                               Abnormality         Status                     ---------                               -----------         ------                     CBC Auto Differential[[id number]]         Abnormal            Final result                 Please view results for these tests on the individual orders.    CBC Auto Differential [[id number]]  (Abnormal) Collected: 07/30/25 2052    Specimen: Blood Updated: 07/30/25 2059     WBC 6.12 10*3/mm3      RBC 4.38 10*6/mm3      Hemoglobin 13.4 g/dL      Hematocrit 39.8 %      MCV 90.9 fL      MCH 30.6 pg      MCHC 33.7 g/dL      RDW 13.3 %      RDW-SD 43.8 fl      MPV 10.6 fL      Platelets 318 10*3/mm3      Neutrophil % 44.3 %      Lymphocyte % 36.9 %      Monocyte % 9.6 %      Eosinophil % 7.2 %      Basophil % 1.8 %      Immature Grans % 0.2 %      Neutrophils, Absolute 2.71 10*3/mm3      Lymphocytes, Absolute 2.26 10*3/mm3      Monocytes, Absolute 0.59 10*3/mm3      Eosinophils, Absolute 0.44 10*3/mm3      Basophils, Absolute 0.11 10*3/mm3      Immature Grans, Absolute 0.01 10*3/mm3      nRBC 0.0 /100 WBC             Imaging Results (Most Recent)       Procedure Component Value Units Date/Time    CT Angiogram Abdomen Pelvis [[id number]] Collected: 07/30/25 2232     Updated: 07/30/25 2305    Narrative:      CT ANGIOGRAM ABDOMEN PELVIS    Date of Exam: 7/30/2025 10:08 PM EDT    Indication: Dark stool.    Comparison: None available.    Technique: CTA of the abdomen and pelvis was performed before and after the uneventful intravenous administration of iodinated contrast. Reconstructed coronal and sagittal images were also obtained. In addition, a 3-D volume rendered image was created   for interpretation. Automated exposure control and iterative reconstruction methods were used.      Findings:  Vasculature: The thoracic aorta is normal in caliber. The abdominal aorta is normal. The celiac axis, SMA, bilateral renal arteries and LARRY are normal in caliber without evidence of occlusion or significant stenosis. The bilateral common iliac, external   iliac, internal iliac, common femoral and visualized superficial femoral and profunda femoris are patent without  hemodynamically significant stenosis    Visualized Chest: Moderate-sized paraesophageal hernia.    Liver: Liver is normal in size and CT density. No focal lesions.    Gallbladder: Cholelithiasis without CT evidence of acute cholecystitis.    Bile Ducts: No billiary dcutal dilation.    Spleen: Spleen is normal in size and CT density.    Pancreas: Pancreas is normal. There is no evidence of pancreatic mass or peripancreatic fluid.    Adrenals: Adrenal glands are unremarkable.    Kidneys: Kidneys are normal in size. There are no stones or hydronephrosis.    Gastrointestinal: Colonic diverticulosis without evidence of acute diverticulitis. There is a small prominence of contrast blush noted within the proximal aspect of the stomach (for example image 43 of series 5).  No other definite evidence of active GI bleed is identified on this study. Mild diffuse colonic wall thickening.    Bladder: The bladder is normal.    Pelvis:  No suspecious mass.    Peritoneum/Mesentery: No fluid collection, ascities, or free air.      Lymph Nodes: No lymphadenopathy.    Vasculature: Unremarkable    Abdominal Wall: Right-sided fat-containing inguinal hernia. Otherwise unremarkable    Bony Structures: No acute osseous abnormality. Degenerative changes noted throughout the visualized spine.      Impression:      Impression:  1.Small prominence of contrast blush within the proximal aspect of the stomach, concerning for active GI bleed. Unfortunately given the lack of delayed images, this cannot be accurately confirmed on this study. Alternatively this may represent a small   arterial vessel within the gastric mucosa. Please consider further evaluation with dedicated nuclear medicine GI bleed scan.  2.No other definite evidence of active GI bleed is identified on this study.  3.Mild diffuse colonic wall thickening, suggestive of colitis.  4.Moderate-sized paraesophageal hernia.  5.Cholelithiasis without CT evidence of acute  cholecystitis.        Electronically Signed: Efe Foote DO    7/30/2025 11:02 PM EDT    Workstation ID: [id number]          reviewed    ECG/EMG Results (most recent)       None          reviewed    Assessment & Plan   Melena/  EGD      I discussed the patient's findings and my recommendations with patient.     Bandar Donohue MD  07/31/25  12:21 EDT    Time: 10 min prior to procedure.

## 2025-07-31 NOTE — CASE MANAGEMENT/SOCIAL WORK
"Discharge Planning Assessment  [facility]     Patient Name: Devyn Tovar  MRN: [id number]  Today's Date: 7/31/2025    Admit Date: 7/30/2025    Plan: return home   Discharge Needs Assessment       Row Name 07/31/25 0938       Living Environment    People in Home alone    Current Living Arrangements home    Potentially Unsafe Housing Conditions none    Primary Care Provided by self    Provides Primary Care For no one    Family Caregiver if Needed friend(s)    Quality of Family Relationships helpful;involved;supportive       Resource/Environmental Concerns    Resource/Environmental Concerns none       Transition Planning    Patient/Family Anticipates Transition to home    Patient/Family Anticipated Services at Transition none    Transportation Anticipated family or friend will provide;car, drives self       Discharge Needs Assessment    Readmission Within the Last 30 Days no previous admission in last 30 days    Equipment Currently Used at Home cane, straight    Concerns to be Addressed no discharge needs identified    Equipment Needed After Discharge none                   Discharge Plan       Row Name 07/31/25 0939       Plan    Plan return home    Patient/Family in Agreement with Plan yes    Plan Comments CCP met with patient at bedside and introduced self/ role of CCP. Face sheet and pharmacy verified. Pt lives alone in a single-story home that has 3 steps to enter. Pt is independent with ADL's, using a straight cane for ambulation. Pt also owns a CPAP, that he states he \"has never used\".  Pt is able to drive. Pt denies having a living will and would like information on one. Pt uses [facility] in [place] as his pharmacy, denies trouble affording medications and wants enrollment in meds to beds for this admission. Pt denies hx of HH and SNF. Pt notes that his sister on his contact list lives in [place]. Dc plan is to return home, stating that he drove himself here but can find a friend to take him home if needed. " Pt denied CCP needs at this time. Rhiannon RUGGIERO RN/CCP                  Continued Care and Services - Admitted Since 7/30/2025    No active coordination exists.          Demographic Summary       Row Name 07/31/25 0938       General Information    Admission Type observation    Arrived From home    Required Notices Provided Observation Status Notice    Referral Source admission list;case finding    Reason for Consult discharge planning    Preferred Language English       Contact Information    Permission Granted to Share Info With                    Functional Status       Row Name 07/31/25 0938       Functional Status    Usual Activity Tolerance good    Current Activity Tolerance good       Assessment of Health Literacy    How often do you have someone help you read hospital materials? Never    How often do you have problems learning about your medical condition because of difficulty understanding written information? Never    How often do you have a problem understanding what is told to you about your medical condition? Never                   Psychosocial    No documentation.                  Abuse/Neglect    No documentation.                  Legal    No documentation.                  Substance Abuse    No documentation.                  Patient Forms    No documentation.                     Rhiannon Walsh RN

## 2025-08-01 VITALS
SYSTOLIC BLOOD PRESSURE: 112 MMHG | TEMPERATURE: 97.6 F | DIASTOLIC BLOOD PRESSURE: 61 MMHG | RESPIRATION RATE: 18 BRPM | WEIGHT: 184.2 LBS | OXYGEN SATURATION: 96 % | BODY MASS INDEX: 27.92 KG/M2 | HEIGHT: 68 IN | HEART RATE: 80 BPM

## 2025-08-01 LAB
ANION GAP SERPL CALCULATED.3IONS-SCNC: 9.7 MMOL/L (ref 5–15)
BUN SERPL-MCNC: 14.7 MG/DL (ref 8–23)
BUN/CREAT SERPL: 16.5 (ref 7–25)
CALCIUM SPEC-SCNC: 9 MG/DL (ref 8.6–10.5)
CHLORIDE SERPL-SCNC: 105 MMOL/L (ref 98–107)
CO2 SERPL-SCNC: 24.3 MMOL/L (ref 22–29)
CREAT SERPL-MCNC: 0.89 MG/DL (ref 0.76–1.27)
DEPRECATED RDW RBC AUTO: 44.8 FL (ref 37–54)
EGFRCR SERPLBLD CKD-EPI 2021: 93.3 ML/MIN/1.73
ERYTHROCYTE [DISTWIDTH] IN BLOOD BY AUTOMATED COUNT: 13.3 % (ref 12.3–15.4)
GLUCOSE SERPL-MCNC: 100 MG/DL (ref 65–99)
HCT VFR BLD AUTO: 34.3 % (ref 37.5–51)
HGB BLD-MCNC: 11.4 G/DL (ref 13–17.7)
MCH RBC QN AUTO: 30.2 PG (ref 26.6–33)
MCHC RBC AUTO-ENTMCNC: 33.2 G/DL (ref 31.5–35.7)
MCV RBC AUTO: 91 FL (ref 79–97)
PLATELET # BLD AUTO: 306 10*3/MM3 (ref 140–450)
PMV BLD AUTO: 10.5 FL (ref 6–12)
POTASSIUM SERPL-SCNC: 3.9 MMOL/L (ref 3.5–5.2)
RBC # BLD AUTO: 3.77 10*6/MM3 (ref 4.14–5.8)
SODIUM SERPL-SCNC: 139 MMOL/L (ref 136–145)
WBC NRBC COR # BLD AUTO: 6.3 10*3/MM3 (ref 3.4–10.8)

## 2025-08-01 PROCEDURE — 80048 BASIC METABOLIC PNL TOTAL CA: CPT | Performed by: HOSPITALIST

## 2025-08-01 PROCEDURE — G0378 HOSPITAL OBSERVATION PER HR: HCPCS

## 2025-08-01 PROCEDURE — 85027 COMPLETE CBC AUTOMATED: CPT | Performed by: HOSPITALIST

## 2025-08-01 PROCEDURE — 99238 HOSP IP/OBS DSCHRG MGMT 30/<: CPT | Performed by: HOSPITALIST

## 2025-08-01 RX ORDER — PANTOPRAZOLE SODIUM 40 MG/1
40 TABLET, DELAYED RELEASE ORAL
Qty: 30 TABLET | Refills: 0 | Status: SHIPPED | OUTPATIENT
Start: 2025-08-02 | End: 2025-09-01

## 2025-08-01 RX ADMIN — Medication 10 ML: at 08:46

## 2025-08-01 RX ADMIN — PANTOPRAZOLE SODIUM 40 MG: 40 TABLET, DELAYED RELEASE ORAL at 07:06

## 2025-08-01 NOTE — PLAN OF CARE
Goal Outcome Evaluation:  Plan of Care Reviewed With: patient        Progress: improving  Outcome Evaluation: VSS, normal sinus rhythm on telemetry, room air, 1 episode of dark red liquid stool at start of shift, no complaints of dizziness, tolerating regular diet, EGD shows gastritis and large hiatal hernia, anticipate discharge home when able, continue plan of care.

## 2025-08-01 NOTE — PLAN OF CARE
Goal Outcome Evaluation:  Plan of Care Reviewed With: patient        Progress: improving  Outcome Evaluation: Discharge education, see D/C navigator.  Discharge teaching to pt, verbalizes understanding of discharge instructions. Pt to ride home in personal car.

## 2025-08-01 NOTE — DISCHARGE INSTR - APPOINTMENTS
Patient has follow up with Jess PAIGE ON  Sept. 5 @ 10:00 am  [phone] Patient needs to arrive 15 mins early.     Patient has follow up with Dr. Rusty Carr on August 6 @ 2:30 pm [phone]  Patient needs to arrive 15 mins early and bring photo ID and Insurance Cards

## 2025-08-01 NOTE — DISCHARGE SUMMARY
Devyn Tovar  1955  [id number]    Hospitalists Discharge Summary    Date of Admission: 7/30/2025  Date of Discharge:  8/1/2025    Primary Discharge Diagnoses: ***    Secondary Discharge Diagnoses: ***      History of Present Illness:***    Hospital Course:      PCP  Patient Care Team:  Rusty Carr MD as PCP - General  Rusty Carr MD as PCP - Family Medicine    Consults:   Consults       Date and Time Order Name Status Description    7/30/2025 11:52 PM Inpatient Gastroenterology Consult Completed             Operations and Procedures Performed:  Procedure(s):  ESOPHAGOGASTRODUODENOSCOPY  07/31 1213 Upper GI Endoscopy  CT Angiogram Abdomen Pelvis  Result Date: 7/30/2025  Narrative: CT ANGIOGRAM ABDOMEN PELVIS Date of Exam: 7/30/2025 10:08 PM EDT Indication: Dark stool. Comparison: None available. Technique: CTA of the abdomen and pelvis was performed before and after the uneventful intravenous administration of iodinated contrast. Reconstructed coronal and sagittal images were also obtained. In addition, a 3-D volume rendered image was created for interpretation. Automated exposure control and iterative reconstruction methods were used. Findings: Vasculature: The thoracic aorta is normal in caliber. The abdominal aorta is normal. The celiac axis, SMA, bilateral renal arteries and LARRY are normal in caliber without evidence of occlusion or significant stenosis. The bilateral common iliac, external iliac, internal iliac, common femoral and visualized superficial femoral and profunda femoris are patent without hemodynamically significant stenosis Visualized Chest: Moderate-sized paraesophageal hernia. Liver: Liver is normal in size and CT density. No focal lesions. Gallbladder: Cholelithiasis without CT evidence of acute cholecystitis. Bile Ducts: No billiary dcutal dilation. Spleen: Spleen is normal in size and CT density. Pancreas: Pancreas is normal. There is no evidence of pancreatic mass or  peripancreatic fluid. Adrenals: Adrenal glands are unremarkable. Kidneys: Kidneys are normal in size. There are no stones or hydronephrosis. Gastrointestinal: Colonic diverticulosis without evidence of acute diverticulitis. There is a small prominence of contrast blush noted within the proximal aspect of the stomach (for example image 43 of series 5). No other definite evidence of active GI bleed is identified on this study. Mild diffuse colonic wall thickening. Bladder: The bladder is normal. Pelvis:  No suspecious mass. Peritoneum/Mesentery: No fluid collection, ascities, or free air.   Lymph Nodes: No lymphadenopathy. Vasculature: Unremarkable Abdominal Wall: Right-sided fat-containing inguinal hernia. Otherwise unremarkable Bony Structures: No acute osseous abnormality. Degenerative changes noted throughout the visualized spine.     Impression: Impression: 1.Small prominence of contrast blush within the proximal aspect of the stomach, concerning for active GI bleed. Unfortunately given the lack of delayed images, this cannot be accurately confirmed on this study. Alternatively this may represent a small arterial vessel within the gastric mucosa. Please consider further evaluation with dedicated nuclear medicine GI bleed scan. 2.No other definite evidence of active GI bleed is identified on this study. 3.Mild diffuse colonic wall thickening, suggestive of colitis. 4.Moderate-sized paraesophageal hernia. 5.Cholelithiasis without CT evidence of acute cholecystitis. Electronically Signed: Efe Foote DO  7/30/2025 11:02 PM EDT  Workstation ID: [id number]      Allergies:  is allergic to lithium and erythromycin.    Durga  {Desc; reviewed/not reviewed:15816}    Discharge Medications:     Discharge Medications        New Medications        Instructions Start Date   pantoprazole 40 MG EC tablet  Commonly known as: PROTONIX   40 mg, Oral, Every Morning Before Breakfast   Start Date: August 2, 2025             Continue These Medications        Instructions Start Date   Advair Diskus 100-50 MCG/DOSE DISKUS  Generic drug: fluticasone-salmeterol   INHALE 1 PUFF BY MOUTH EVERY TWELVE HOURS, RINSE MOUTH AFTER EACH USE      clobetasol 0.05 % external solution  Commonly known as: TEMOVATE   APPLY TO AFFECTED AREA OF SCALP 2 TIMES A DAY FOR UP TO 2 WEEKS      fluticasone 50 MCG/ACT nasal spray  Commonly known as: FLONASE   instill 2 sprays in each nostril daily      glucosamine-chondroitin 500-400 MG capsule capsule   1 capsule, 2 Times Daily With Meals      ketoconazole 2 % shampoo  Commonly known as: NIZORAL   APPLY TO AFFECTED AREA OF SCALP 2 TIMES PER WEEK, RINSE AFTER 5 TO 10 MINUTES      multivitamin with minerals tablet tablet   1 tablet, Daily      PARoxetine 10 MG tablet  Commonly known as: PAXIL   10 mg, Every Morning      SKYRIZI PEN SC   Inject  under the skin into the appropriate area as directed. Patient unsure of dosage      triamcinolone 0.1 % cream  Commonly known as: KENALOG   1 Application, Nightly               Last Lab Results:   Lab Results (most recent)       Procedure Component Value Units Date/Time    Basic Metabolic Panel [[id number]]  (Abnormal) Collected: 08/01/25 0755    Specimen: Blood Updated: 08/01/25 0825     Glucose 100 mg/dL      BUN 14.7 mg/dL      Creatinine 0.89 mg/dL      Sodium 139 mmol/L      Potassium 3.9 mmol/L      Chloride 105 mmol/L      CO2 24.3 mmol/L      Calcium 9.0 mg/dL      BUN/Creatinine Ratio 16.5     Anion Gap 9.7 mmol/L      eGFR 93.3 mL/min/1.73     Narrative:      GFR Categories in Chronic Kidney Disease (CKD)              GFR Category          GFR (mL/min/1.73)    Interpretation  G1                    90 or greater        Normal or high (1)  G2                    60-89                Mild decrease (1)  G3a                   45-59                Mild to moderate decrease  G3b                   30-44                Moderate to severe decrease  G4                     15-29                Severe decrease  G5                    14 or less           Kidney failure    (1)In the absence of evidence of kidney disease, neither GFR category G1 or G2 fulfill the criteria for CKD.    eGFR calculation 2021 CKD-EPI creatinine equation, which does not include race as a factor    CBC (No Diff) [[id number]]  (Abnormal) Collected: 08/01/25 0755    Specimen: Blood Updated: 08/01/25 0810     WBC 6.30 10*3/mm3      RBC 3.77 10*6/mm3      Hemoglobin 11.4 g/dL      Hematocrit 34.3 %      MCV 91.0 fL      MCH 30.2 pg      MCHC 33.2 g/dL      RDW 13.3 %      RDW-SD 44.8 fl      MPV 10.5 fL      Platelets 306 10*3/mm3     H. Pylori Antigen, Stool - Stool, Per Rectum [[id number]] Collected: 07/31/25 2132    Specimen: Stool from Per Rectum Updated: 07/31/25 2132    Hemoglobin & Hematocrit, Blood [[id number]]  (Abnormal) Collected: 07/31/25 1657    Specimen: Blood Updated: 07/31/25 1705     Hemoglobin 11.7 g/dL      Hematocrit 36.0 %     Hemoglobin & Hematocrit, Blood [[id number]]  (Abnormal) Collected: 07/31/25 0939    Specimen: Blood Updated: 07/31/25 0944     Hemoglobin 12.7 g/dL      Hematocrit 38.9 %     Basic Metabolic Panel [[id number]]  (Abnormal) Collected: 07/31/25 0341    Specimen: Blood Updated: 07/31/25 0500     Glucose 101 mg/dL      BUN 16.5 mg/dL      Creatinine 0.84 mg/dL      Sodium 141 mmol/L      Potassium 4.2 mmol/L      Chloride 106 mmol/L      CO2 26.3 mmol/L      Calcium 8.9 mg/dL      BUN/Creatinine Ratio 19.6     Anion Gap 8.7 mmol/L      eGFR 95.0 mL/min/1.73     Narrative:      GFR Categories in Chronic Kidney Disease (CKD)              GFR Category          GFR (mL/min/1.73)    Interpretation  G1                    90 or greater        Normal or high (1)  G2                    60-89                Mild decrease (1)  G3a                   45-59                Mild to moderate decrease  G3b                   30-44                Moderate to severe decrease  G4                     15-29                Severe decrease  G5                    14 or less           Kidney failure    (1)In the absence of evidence of kidney disease, neither GFR category G1 or G2 fulfill the criteria for CKD.    eGFR calculation 2021 CKD-EPI creatinine equation, which does not include race as a factor    CBC (No Diff) [[id number]]  (Abnormal) Collected: 07/31/25 0340    Specimen: Blood Updated: 07/31/25 0437     WBC 7.72 10*3/mm3      RBC 4.15 10*6/mm3      Hemoglobin 12.5 g/dL      Hematocrit 38.1 %      MCV 91.8 fL      MCH 30.1 pg      MCHC 32.8 g/dL      RDW 13.3 %      RDW-SD 44.8 fl      MPV 11.2 fL      Platelets 300 10*3/mm3     Protime-INR [[id number]]  (Normal) Collected: 07/30/25 2115    Specimen: Blood Updated: 07/30/25 2154     Protime 14.0 Seconds      INR 1.05    Narrative:      Therapeutic Ranges for INR: 2.0-3.0 (PT 20-30)                              2.5-3.5 (PT 25-34)    Comprehensive Metabolic Panel [[id number]] Collected: 07/30/25 2052    Specimen: Blood Updated: 07/30/25 2125     Glucose 97 mg/dL      BUN 18.7 mg/dL      Creatinine 0.83 mg/dL      Sodium 142 mmol/L      Potassium 4.4 mmol/L      Chloride 107 mmol/L      CO2 25.1 mmol/L      Calcium 9.5 mg/dL      Total Protein 6.8 g/dL      Albumin 4.1 g/dL      ALT (SGPT) 17 U/L      AST (SGOT) 15 U/L      Alkaline Phosphatase 84 U/L      Total Bilirubin 0.2 mg/dL      Globulin 2.7 gm/dL      A/G Ratio 1.5 g/dL      BUN/Creatinine Ratio 22.5     Anion Gap 9.9 mmol/L      eGFR 95.3 mL/min/1.73     Narrative:      GFR Categories in Chronic Kidney Disease (CKD)              GFR Category          GFR (mL/min/1.73)    Interpretation  G1                    90 or greater        Normal or high (1)  G2                    60-89                Mild decrease (1)  G3a                   45-59                Mild to moderate decrease  G3b                   30-44                Moderate to severe decrease  G4                    15-29                Severe  decrease  G5                    14 or less           Kidney failure    (1)In the absence of evidence of kidney disease, neither GFR category G1 or G2 fulfill the criteria for CKD.    eGFR calculation 2021 CKD-EPI creatinine equation, which does not include race as a factor    Lipase [[id number]]  (Normal) Collected: 07/30/25 2052    Specimen: Blood Updated: 07/30/25 2125     Lipase 47 U/L     CBC & Differential [[id number]]  (Abnormal) Collected: 07/30/25 2052    Specimen: Blood Updated: 07/30/25 2059    Narrative:      The following orders were created for panel order CBC & Differential.  Procedure                               Abnormality         Status                     ---------                               -----------         ------                     CBC Auto Differential[[id number]]        Abnormal            Final result                 Please view results for these tests on the individual orders.    CBC Auto Differential [[id number]]  (Abnormal) Collected: 07/30/25 2052    Specimen: Blood Updated: 07/30/25 2059     WBC 6.12 10*3/mm3      RBC 4.38 10*6/mm3      Hemoglobin 13.4 g/dL      Hematocrit 39.8 %      MCV 90.9 fL      MCH 30.6 pg      MCHC 33.7 g/dL      RDW 13.3 %      RDW-SD 43.8 fl      MPV 10.6 fL      Platelets 318 10*3/mm3      Neutrophil % 44.3 %      Lymphocyte % 36.9 %      Monocyte % 9.6 %      Eosinophil % 7.2 %      Basophil % 1.8 %      Immature Grans % 0.2 %      Neutrophils, Absolute 2.71 10*3/mm3      Lymphocytes, Absolute 2.26 10*3/mm3      Monocytes, Absolute 0.59 10*3/mm3      Eosinophils, Absolute 0.44 10*3/mm3      Basophils, Absolute 0.11 10*3/mm3      Immature Grans, Absolute 0.01 10*3/mm3      nRBC 0.0 /100 WBC           Imaging Results (Most Recent)       Procedure Component Value Units Date/Time    CT Angiogram Abdomen Pelvis [[id number]] Collected: 07/30/25 2232     Updated: 07/30/25 2305    Narrative:      CT ANGIOGRAM ABDOMEN PELVIS    Date of Exam: 7/30/2025 10:08 PM  EDT    Indication: Dark stool.    Comparison: None available.    Technique: CTA of the abdomen and pelvis was performed before and after the uneventful intravenous administration of iodinated contrast. Reconstructed coronal and sagittal images were also obtained. In addition, a 3-D volume rendered image was created   for interpretation. Automated exposure control and iterative reconstruction methods were used.      Findings:  Vasculature: The thoracic aorta is normal in caliber. The abdominal aorta is normal. The celiac axis, SMA, bilateral renal arteries and LARRY are normal in caliber without evidence of occlusion or significant stenosis. The bilateral common iliac, external   iliac, internal iliac, common femoral and visualized superficial femoral and profunda femoris are patent without hemodynamically significant stenosis    Visualized Chest: Moderate-sized paraesophageal hernia.    Liver: Liver is normal in size and CT density. No focal lesions.    Gallbladder: Cholelithiasis without CT evidence of acute cholecystitis.    Bile Ducts: No billiary dcutal dilation.    Spleen: Spleen is normal in size and CT density.    Pancreas: Pancreas is normal. There is no evidence of pancreatic mass or peripancreatic fluid.    Adrenals: Adrenal glands are unremarkable.    Kidneys: Kidneys are normal in size. There are no stones or hydronephrosis.    Gastrointestinal: Colonic diverticulosis without evidence of acute diverticulitis. There is a small prominence of contrast blush noted within the proximal aspect of the stomach (for example image 43 of series 5).  No other definite evidence of active GI bleed is identified on this study. Mild diffuse colonic wall thickening.    Bladder: The bladder is normal.    Pelvis:  No suspecious mass.    Peritoneum/Mesentery: No fluid collection, ascities, or free air.      Lymph Nodes: No lymphadenopathy.    Vasculature: Unremarkable    Abdominal Wall: Right-sided fat-containing inguinal  hernia. Otherwise unremarkable    Bony Structures: No acute osseous abnormality. Degenerative changes noted throughout the visualized spine.      Impression:      Impression:  1.Small prominence of contrast blush within the proximal aspect of the stomach, concerning for active GI bleed. Unfortunately given the lack of delayed images, this cannot be accurately confirmed on this study. Alternatively this may represent a small   arterial vessel within the gastric mucosa. Please consider further evaluation with dedicated nuclear medicine GI bleed scan.  2.No other definite evidence of active GI bleed is identified on this study.  3.Mild diffuse colonic wall thickening, suggestive of colitis.  4.Moderate-sized paraesophageal hernia.  5.Cholelithiasis without CT evidence of acute cholecystitis.        Electronically Signed: Efe Foote DO    7/30/2025 11:02 PM EDT    Workstation ID: [id number]            PROCEDURES  Procedure(s):  ESOPHAGOGASTRODUODENOSCOPY    Condition on Discharge:  ***    Physical Exam at Discharge  Vital Signs  Temp:  [97.4 °F (36.3 °C)-97.9 °F (36.6 °C)] 97.6 °F (36.4 °C)  Heart Rate:  [59-97] 80  Resp:  [11-20] 18  BP: ()/(48-95) 112/61    Physical Exam:  Physical Exam   Constitutional: Patient appears well-developed and well-nourished and in no acute distress   HEENT:   Head: Normocephalic and atraumatic.   Eyes:  Pupils are equal, round, and reactive to light. EOM are intact. Sclera are anicteric and non-injected.  Mouth and Throat: Patient has moist mucous membranes. Oropharynx is clear of any erythema or exudate.     Neck: Neck supple. No JVD present. No thyromegaly present. No lymphadenopathy present.  Cardiovascular: Regular rate, regular rhythm, S1 normal and S2 normal.  Exam reveals no gallop and no friction rub.  No murmur heard.  Pulmonary/Chest: Lungs are clear to auscultation bilaterally. No respiratory distress. No wheezes. No rhonchi. No rales.   Abdominal: Soft. Bowel  sounds are normal. No distension and no mass. There is no hepatosplenomegaly. There is no tenderness.   Musculoskeletal: Normal Muscle tone  Extremities: No edema. Pulses are palpable in all 4 extremities.  Neurological: Patient is alert and oriented to person, place, and time. Cranial nerves II-XII are grossly intact with no focal deficits.  Skin: Skin is warm. No rash noted. Nails show no clubbing.  No cyanosis or erythema.    Discharge Disposition  ***    Visiting Nurse:    {YES/NO:200010}    Home PT/OT:  {YES/NO:200010}    Home Safety Evaluation:  {YES/NO:200010}    DME  ***    Discharge Diet:      Dietary Orders (From admission, onward)       Start     Ordered    07/31/25 1453  Diet: Regular/House; Fluid Consistency: Thin (IDDSI 0)  Diet Effective Now        References:    Diet Order Definitions   Question Answer Comment   Diets: Regular/House    Fluid Consistency: Thin (IDDSI 0)        07/31/25 1453                    Activity at Discharge:  ***    Pre-discharge education  {Discharge Education:09311}      Follow-up Appointments  Future Appointments   Date Time Provider Department Center   8/25/2025  1:00 PM Miguel Meade MD [facility] None     Additional Instructions for the Follow-ups that You Need to Schedule       Discharge Follow-up with PCP   As directed       Currently Documented PCP:    Rusty Carr MD    PCP Phone Number:    [phone]     Follow Up Details: 1-2 weeks        Discharge Follow-up with Specified Provider: Bandar Donohue M.D.; 1 Month   As directed      To: Bandar Donohue M.D.   Follow Up: 1 Month                Test Results Pending at Discharge  Pending Labs       Order Current Status    H. Pylori Antigen, Stool - Stool, Per Rectum In process             Ethan Ramírez MD  08/01/25  08:43 EDT    Time: {Time spent:606181929} (if over 30 minutes give explanation as to why it took greater than 30 minutes)                MD  08/01/25  08:43 EDT    Time: <30 minutes

## 2025-08-01 NOTE — CASE MANAGEMENT/SOCIAL WORK
Case Management Discharge Note      Final Note: return home, denied CCP needs          Selected Continued Care - Discharged on 8/1/2025 Admission date: 7/30/2025 - Discharge disposition: Home or Self Care      Destination    No services have been selected for the patient.                Durable Medical Equipment    No services have been selected for the patient.                Dialysis/Infusion    No services have been selected for the patient.                Home Medical Care    No services have been selected for the patient.                Therapy    No services have been selected for the patient.                Community Resources    No services have been selected for the patient.                Community & DME    No services have been selected for the patient.                         Final Discharge Disposition Code: 01 - home or self-care

## 2025-08-02 LAB — H PYLORI AG STL QL IA: NEGATIVE

## 2025-08-04 ENCOUNTER — READMISSION MANAGEMENT (OUTPATIENT)
Dept: CALL CENTER | Facility: HOSPITAL | Age: 70
End: 2025-08-04
Payer: COMMERCIAL

## 2025-08-06 ENCOUNTER — READMISSION MANAGEMENT (OUTPATIENT)
Dept: CALL CENTER | Facility: HOSPITAL | Age: 70
End: 2025-08-06
Payer: COMMERCIAL

## 2025-08-06 ENCOUNTER — HOSPITAL ENCOUNTER (INPATIENT)
Facility: HOSPITAL | Age: 70
LOS: 3 days | Discharge: HOME | DRG: 378 | End: 2025-08-09
Attending: EMERGENCY MEDICINE | Admitting: FAMILY MEDICINE
Payer: MEDICARE

## 2025-08-06 DIAGNOSIS — K62.5 RECTAL BLEEDING: ICD-10-CM

## 2025-08-06 DIAGNOSIS — K51.411: ICD-10-CM

## 2025-08-06 DIAGNOSIS — R19.5 DARK STOOLS: ICD-10-CM

## 2025-08-06 DIAGNOSIS — K57.93 GASTROINTESTINAL HEMORRHAGE ASSOCIATED WITH INTESTINAL DIVERTICULITIS: ICD-10-CM

## 2025-08-06 DIAGNOSIS — D64.9 LOW HEMOGLOBIN: ICD-10-CM

## 2025-08-06 DIAGNOSIS — R42 DIZZINESS: ICD-10-CM

## 2025-08-06 DIAGNOSIS — D50.0 IRON DEFICIENCY ANEMIA DUE TO CHRONIC BLOOD LOSS: Primary | ICD-10-CM

## 2025-08-06 PROBLEM — K92.2 GI BLEED: Status: ACTIVE | Noted: 2025-08-06

## 2025-08-06 LAB
ABO GROUP BLD: NORMAL
ALBUMIN SERPL-MCNC: 3.5 G/DL (ref 3.5–5.2)
ALBUMIN/GLOB SERPL: 1.9 G/DL
ALP SERPL-CCNC: 54 U/L (ref 39–117)
ALT SERPL W P-5'-P-CCNC: 17 U/L (ref 1–41)
ANION GAP SERPL CALCULATED.3IONS-SCNC: 9.3 MMOL/L (ref 5–15)
AST SERPL-CCNC: 17 U/L (ref 1–40)
BASOPHILS # BLD AUTO: 0.02 10*3/MM3 (ref 0–0.2)
BASOPHILS NFR BLD AUTO: 0.3 % (ref 0–1.5)
BILIRUB SERPL-MCNC: 0.2 MG/DL (ref 0–1.2)
BLD GP AB SCN SERPL QL: NEGATIVE
BUN SERPL-MCNC: 17.2 MG/DL (ref 8–23)
BUN/CREAT SERPL: 20.5 (ref 7–25)
CALCIUM SPEC-SCNC: 8.2 MG/DL (ref 8.6–10.5)
CHLORIDE SERPL-SCNC: 105 MMOL/L (ref 98–107)
CO2 SERPL-SCNC: 23.7 MMOL/L (ref 22–29)
CREAT SERPL-MCNC: 0.84 MG/DL (ref 0.76–1.27)
DEPRECATED RDW RBC AUTO: 49.5 FL (ref 37–54)
EGFRCR SERPLBLD CKD-EPI 2021: 95 ML/MIN/1.73
EOSINOPHIL # BLD AUTO: 0.16 10*3/MM3 (ref 0–0.4)
EOSINOPHIL NFR BLD AUTO: 2.1 % (ref 0.3–6.2)
ERYTHROCYTE [DISTWIDTH] IN BLOOD BY AUTOMATED COUNT: 15.1 % (ref 12.3–15.4)
GLOBULIN UR ELPH-MCNC: 1.8 GM/DL
GLUCOSE SERPL-MCNC: 114 MG/DL (ref 65–99)
HCT VFR BLD AUTO: 16 % (ref 37.5–51)
HGB BLD-MCNC: 5.2 G/DL (ref 13–17.7)
IMM GRANULOCYTES # BLD AUTO: 0.03 10*3/MM3 (ref 0–0.05)
IMM GRANULOCYTES NFR BLD AUTO: 0.4 % (ref 0–0.5)
LYMPHOCYTES # BLD AUTO: 1.36 10*3/MM3 (ref 0.7–3.1)
LYMPHOCYTES NFR BLD AUTO: 18.2 % (ref 19.6–45.3)
MCH RBC QN AUTO: 31 PG (ref 26.6–33)
MCHC RBC AUTO-ENTMCNC: 32.5 G/DL (ref 31.5–35.7)
MCV RBC AUTO: 95.2 FL (ref 79–97)
MONOCYTES # BLD AUTO: 0.52 10*3/MM3 (ref 0.1–0.9)
MONOCYTES NFR BLD AUTO: 7 % (ref 5–12)
NEUTROPHILS NFR BLD AUTO: 5.39 10*3/MM3 (ref 1.7–7)
NEUTROPHILS NFR BLD AUTO: 72 % (ref 42.7–76)
NRBC BLD AUTO-RTO: 0 /100 WBC (ref 0–0.2)
PLATELET # BLD AUTO: 281 10*3/MM3 (ref 140–450)
PMV BLD AUTO: 10.5 FL (ref 6–12)
POTASSIUM SERPL-SCNC: 3.9 MMOL/L (ref 3.5–5.2)
PROT SERPL-MCNC: 5.3 G/DL (ref 6–8.5)
QT INTERVAL: 392 MS
QTC INTERVAL: 474 MS
RBC # BLD AUTO: 1.68 10*6/MM3 (ref 4.14–5.8)
RH BLD: POSITIVE
SODIUM SERPL-SCNC: 138 MMOL/L (ref 136–145)
T&S EXPIRATION DATE: NORMAL
WBC NRBC COR # BLD AUTO: 7.48 10*3/MM3 (ref 3.4–10.8)

## 2025-08-06 PROCEDURE — 86900 BLOOD TYPING SEROLOGIC ABO: CPT

## 2025-08-06 PROCEDURE — 94799 UNLISTED PULMONARY SVC/PX: CPT

## 2025-08-06 PROCEDURE — 99285 EMERGENCY DEPT VISIT HI MDM: CPT | Performed by: EMERGENCY MEDICINE

## 2025-08-06 PROCEDURE — 94761 N-INVAS EAR/PLS OXIMETRY MLT: CPT

## 2025-08-06 PROCEDURE — 86901 BLOOD TYPING SEROLOGIC RH(D): CPT

## 2025-08-06 PROCEDURE — 86923 COMPATIBILITY TEST ELECTRIC: CPT

## 2025-08-06 PROCEDURE — 80053 COMPREHEN METABOLIC PANEL: CPT

## 2025-08-06 PROCEDURE — P9016 RBC LEUKOCYTES REDUCED: HCPCS

## 2025-08-06 PROCEDURE — 93005 ELECTROCARDIOGRAM TRACING: CPT

## 2025-08-06 PROCEDURE — 93010 ELECTROCARDIOGRAM REPORT: CPT | Performed by: INTERNAL MEDICINE

## 2025-08-06 PROCEDURE — 86850 RBC ANTIBODY SCREEN: CPT

## 2025-08-06 PROCEDURE — 25810000003 SODIUM CHLORIDE 0.9 % SOLUTION

## 2025-08-06 PROCEDURE — 99223 1ST HOSP IP/OBS HIGH 75: CPT | Performed by: FAMILY MEDICINE

## 2025-08-06 PROCEDURE — 85025 COMPLETE CBC W/AUTO DIFF WBC: CPT

## 2025-08-06 PROCEDURE — 36430 TRANSFUSION BLD/BLD COMPNT: CPT

## 2025-08-06 RX ORDER — ACETAMINOPHEN 325 MG/1
650 TABLET ORAL EVERY 4 HOURS PRN
Status: DISCONTINUED | OUTPATIENT
Start: 2025-08-06 | End: 2025-08-09 | Stop reason: HOSPADM

## 2025-08-06 RX ORDER — ONDANSETRON 2 MG/ML
4 INJECTION INTRAMUSCULAR; INTRAVENOUS EVERY 6 HOURS PRN
Status: DISCONTINUED | OUTPATIENT
Start: 2025-08-06 | End: 2025-08-09 | Stop reason: HOSPADM

## 2025-08-06 RX ORDER — ACETAMINOPHEN 650 MG/1
650 SUPPOSITORY RECTAL EVERY 4 HOURS PRN
Status: DISCONTINUED | OUTPATIENT
Start: 2025-08-06 | End: 2025-08-09 | Stop reason: HOSPADM

## 2025-08-06 RX ORDER — SODIUM CHLORIDE 0.9 % (FLUSH) 0.9 %
10 SYRINGE (ML) INJECTION AS NEEDED
Status: DISCONTINUED | OUTPATIENT
Start: 2025-08-06 | End: 2025-08-09 | Stop reason: HOSPADM

## 2025-08-06 RX ORDER — ACETAMINOPHEN 160 MG/5ML
650 SOLUTION ORAL EVERY 4 HOURS PRN
Status: DISCONTINUED | OUTPATIENT
Start: 2025-08-06 | End: 2025-08-09 | Stop reason: HOSPADM

## 2025-08-06 RX ORDER — PAROXETINE 20 MG/1
10 TABLET, FILM COATED ORAL EVERY MORNING
Status: DISCONTINUED | OUTPATIENT
Start: 2025-08-07 | End: 2025-08-09 | Stop reason: HOSPADM

## 2025-08-06 RX ORDER — NITROGLYCERIN 0.4 MG/1
0.4 TABLET SUBLINGUAL
Status: DISCONTINUED | OUTPATIENT
Start: 2025-08-06 | End: 2025-08-08

## 2025-08-06 RX ORDER — PANTOPRAZOLE SODIUM 40 MG/1
40 TABLET, DELAYED RELEASE ORAL
Status: DISCONTINUED | OUTPATIENT
Start: 2025-08-07 | End: 2025-08-06 | Stop reason: SDUPTHER

## 2025-08-06 RX ORDER — SODIUM CHLORIDE 0.9 % (FLUSH) 0.9 %
10 SYRINGE (ML) INJECTION EVERY 12 HOURS SCHEDULED
Status: DISCONTINUED | OUTPATIENT
Start: 2025-08-06 | End: 2025-08-09 | Stop reason: HOSPADM

## 2025-08-06 RX ORDER — ONDANSETRON 4 MG/1
4 TABLET, ORALLY DISINTEGRATING ORAL EVERY 6 HOURS PRN
Status: DISCONTINUED | OUTPATIENT
Start: 2025-08-06 | End: 2025-08-09 | Stop reason: HOSPADM

## 2025-08-06 RX ORDER — SODIUM CHLORIDE 9 MG/ML
INJECTION, SOLUTION INTRAVENOUS
Status: ACTIVE
Start: 2025-08-06 | End: 2025-08-07

## 2025-08-06 RX ORDER — SODIUM CHLORIDE 9 MG/ML
INJECTION, SOLUTION INTRAVENOUS
Status: COMPLETED
Start: 2025-08-06 | End: 2025-08-06

## 2025-08-06 RX ORDER — SODIUM CHLORIDE 9 MG/ML
40 INJECTION, SOLUTION INTRAVENOUS AS NEEDED
Status: DISCONTINUED | OUTPATIENT
Start: 2025-08-06 | End: 2025-08-09 | Stop reason: HOSPADM

## 2025-08-06 RX ORDER — PANTOPRAZOLE SODIUM 40 MG/10ML
40 INJECTION, POWDER, LYOPHILIZED, FOR SOLUTION INTRAVENOUS
Status: DISCONTINUED | OUTPATIENT
Start: 2025-08-06 | End: 2025-08-08

## 2025-08-06 RX ADMIN — PANTOPRAZOLE SODIUM 40 MG: 40 INJECTION, POWDER, FOR SOLUTION INTRAVENOUS at 18:48

## 2025-08-06 RX ADMIN — Medication 10 ML: at 22:15

## 2025-08-06 RX ADMIN — SODIUM CHLORIDE 250 ML: 0.9 INJECTION, SOLUTION INTRAVENOUS at 23:03

## 2025-08-06 RX ADMIN — SODIUM CHLORIDE 250 ML: 9 INJECTION, SOLUTION INTRAVENOUS at 23:03

## 2025-08-07 PROBLEM — K62.5 RECTAL BLEEDING: Status: ACTIVE | Noted: 2025-08-06

## 2025-08-07 LAB
ANION GAP SERPL CALCULATED.3IONS-SCNC: 7.4 MMOL/L (ref 5–15)
BASOPHILS # BLD AUTO: 0.04 10*3/MM3 (ref 0–0.2)
BASOPHILS NFR BLD AUTO: 0.7 % (ref 0–1.5)
BH BB BLOOD EXPIRATION DATE: NORMAL
BH BB BLOOD EXPIRATION DATE: NORMAL
BH BB BLOOD TYPE BARCODE: 6200
BH BB BLOOD TYPE BARCODE: 6200
BH BB DISPENSE STATUS: NORMAL
BH BB DISPENSE STATUS: NORMAL
BH BB PRODUCT CODE: NORMAL
BH BB PRODUCT CODE: NORMAL
BH BB UNIT NUMBER: NORMAL
BH BB UNIT NUMBER: NORMAL
BUN SERPL-MCNC: 12.3 MG/DL (ref 8–23)
BUN/CREAT SERPL: 15.4 (ref 7–25)
CALCIUM SPEC-SCNC: 8 MG/DL (ref 8.6–10.5)
CHLORIDE SERPL-SCNC: 110 MMOL/L (ref 98–107)
CO2 SERPL-SCNC: 23.6 MMOL/L (ref 22–29)
CREAT SERPL-MCNC: 0.8 MG/DL (ref 0.76–1.27)
CROSSMATCH INTERPRETATION: NORMAL
CROSSMATCH INTERPRETATION: NORMAL
DEPRECATED RDW RBC AUTO: 52 FL (ref 37–54)
EGFRCR SERPLBLD CKD-EPI 2021: 96.4 ML/MIN/1.73
EOSINOPHIL # BLD AUTO: 0.15 10*3/MM3 (ref 0–0.4)
EOSINOPHIL NFR BLD AUTO: 2.7 % (ref 0.3–6.2)
ERYTHROCYTE [DISTWIDTH] IN BLOOD BY AUTOMATED COUNT: 16.9 % (ref 12.3–15.4)
GLUCOSE SERPL-MCNC: 101 MG/DL (ref 65–99)
HCT VFR BLD AUTO: 21 % (ref 37.5–51)
HCT VFR BLD AUTO: 26.9 % (ref 37.5–51)
HGB BLD-MCNC: 6.9 G/DL (ref 13–17.7)
HGB BLD-MCNC: 8.7 G/DL (ref 13–17.7)
IMM GRANULOCYTES # BLD AUTO: 0.02 10*3/MM3 (ref 0–0.05)
IMM GRANULOCYTES NFR BLD AUTO: 0.4 % (ref 0–0.5)
LYMPHOCYTES # BLD AUTO: 1.73 10*3/MM3 (ref 0.7–3.1)
LYMPHOCYTES NFR BLD AUTO: 31.5 % (ref 19.6–45.3)
MCH RBC QN AUTO: 29.5 PG (ref 26.6–33)
MCHC RBC AUTO-ENTMCNC: 32.9 G/DL (ref 31.5–35.7)
MCV RBC AUTO: 89.7 FL (ref 79–97)
MONOCYTES # BLD AUTO: 0.51 10*3/MM3 (ref 0.1–0.9)
MONOCYTES NFR BLD AUTO: 9.3 % (ref 5–12)
NEUTROPHILS NFR BLD AUTO: 3.04 10*3/MM3 (ref 1.7–7)
NEUTROPHILS NFR BLD AUTO: 55.4 % (ref 42.7–76)
NRBC BLD AUTO-RTO: 0 /100 WBC (ref 0–0.2)
PLATELET # BLD AUTO: 259 10*3/MM3 (ref 140–450)
PMV BLD AUTO: 10.3 FL (ref 6–12)
POTASSIUM SERPL-SCNC: 4.1 MMOL/L (ref 3.5–5.2)
RBC # BLD AUTO: 2.34 10*6/MM3 (ref 4.14–5.8)
SODIUM SERPL-SCNC: 141 MMOL/L (ref 136–145)
UNIT  ABO: NORMAL
UNIT  ABO: NORMAL
UNIT  RH: NORMAL
UNIT  RH: NORMAL
WBC NRBC COR # BLD AUTO: 5.49 10*3/MM3 (ref 3.4–10.8)

## 2025-08-07 PROCEDURE — 85018 HEMOGLOBIN: CPT | Performed by: FAMILY MEDICINE

## 2025-08-07 PROCEDURE — 85014 HEMATOCRIT: CPT | Performed by: FAMILY MEDICINE

## 2025-08-07 PROCEDURE — 85025 COMPLETE CBC W/AUTO DIFF WBC: CPT | Performed by: FAMILY MEDICINE

## 2025-08-07 PROCEDURE — 99233 SBSQ HOSP IP/OBS HIGH 50: CPT | Performed by: FAMILY MEDICINE

## 2025-08-07 PROCEDURE — 80048 BASIC METABOLIC PNL TOTAL CA: CPT | Performed by: FAMILY MEDICINE

## 2025-08-07 PROCEDURE — 99222 1ST HOSP IP/OBS MODERATE 55: CPT | Performed by: STUDENT IN AN ORGANIZED HEALTH CARE EDUCATION/TRAINING PROGRAM

## 2025-08-07 PROCEDURE — P9016 RBC LEUKOCYTES REDUCED: HCPCS

## 2025-08-07 PROCEDURE — 36430 TRANSFUSION BLD/BLD COMPNT: CPT

## 2025-08-07 PROCEDURE — 86900 BLOOD TYPING SEROLOGIC ABO: CPT

## 2025-08-07 RX ORDER — BISACODYL 5 MG/1
20 TABLET, DELAYED RELEASE ORAL ONCE
Status: COMPLETED | OUTPATIENT
Start: 2025-08-08 | End: 2025-08-08

## 2025-08-07 RX ORDER — SODIUM CHLORIDE 9 MG/ML
INJECTION, SOLUTION INTRAVENOUS
Status: ACTIVE
Start: 2025-08-07 | End: 2025-08-08

## 2025-08-07 RX ORDER — MAGNESIUM CARB/ALUMINUM HYDROX 105-160MG
296 TABLET,CHEWABLE ORAL ONCE
Status: COMPLETED | OUTPATIENT
Start: 2025-08-07 | End: 2025-08-07

## 2025-08-07 RX ADMIN — Medication 10 ML: at 21:05

## 2025-08-07 RX ADMIN — Medication 10 ML: at 09:46

## 2025-08-07 RX ADMIN — PAROXETINE HYDROCHLORIDE 10 MG: 20 TABLET, FILM COATED ORAL at 06:59

## 2025-08-07 RX ADMIN — POLYETHYLENE GLYCOL-3350 AND ELECTROLYTES 2000 ML: 236; 6.74; 5.86; 2.97; 22.74 POWDER, FOR SOLUTION ORAL at 17:14

## 2025-08-07 RX ADMIN — PANTOPRAZOLE SODIUM 40 MG: 40 INJECTION, POWDER, FOR SOLUTION INTRAVENOUS at 07:00

## 2025-08-07 RX ADMIN — PANTOPRAZOLE SODIUM 40 MG: 40 INJECTION, POWDER, FOR SOLUTION INTRAVENOUS at 17:14

## 2025-08-07 RX ADMIN — LEADER MAGNESIUM CITRATE ORAL SOLUTION - GRAPE 296 ML: 1.75 LIQUID ORAL at 17:50

## 2025-08-08 ENCOUNTER — ANESTHESIA (OUTPATIENT)
Dept: PERIOP | Facility: HOSPITAL | Age: 70
End: 2025-08-08
Payer: MEDICARE

## 2025-08-08 ENCOUNTER — ANESTHESIA EVENT (OUTPATIENT)
Dept: PERIOP | Facility: HOSPITAL | Age: 70
End: 2025-08-08
Payer: MEDICARE

## 2025-08-08 LAB
ANION GAP SERPL CALCULATED.3IONS-SCNC: 8.9 MMOL/L (ref 5–15)
BASOPHILS # BLD AUTO: 0.05 10*3/MM3 (ref 0–0.2)
BASOPHILS NFR BLD AUTO: 1 % (ref 0–1.5)
BH BB BLOOD EXPIRATION DATE: NORMAL
BH BB BLOOD EXPIRATION DATE: NORMAL
BH BB BLOOD TYPE BARCODE: 6200
BH BB BLOOD TYPE BARCODE: 6200
BH BB DISPENSE STATUS: NORMAL
BH BB DISPENSE STATUS: NORMAL
BH BB PRODUCT CODE: NORMAL
BH BB PRODUCT CODE: NORMAL
BH BB UNIT NUMBER: NORMAL
BH BB UNIT NUMBER: NORMAL
BUN SERPL-MCNC: 8 MG/DL (ref 8–23)
BUN/CREAT SERPL: 9.2 (ref 7–25)
CALCIUM SPEC-SCNC: 8.2 MG/DL (ref 8.6–10.5)
CHLORIDE SERPL-SCNC: 108 MMOL/L (ref 98–107)
CO2 SERPL-SCNC: 25.1 MMOL/L (ref 22–29)
CREAT SERPL-MCNC: 0.87 MG/DL (ref 0.76–1.27)
CROSSMATCH INTERPRETATION: NORMAL
CROSSMATCH INTERPRETATION: NORMAL
DEPRECATED RDW RBC AUTO: 53 FL (ref 37–54)
EGFRCR SERPLBLD CKD-EPI 2021: 94 ML/MIN/1.73
EOSINOPHIL # BLD AUTO: 0.29 10*3/MM3 (ref 0–0.4)
EOSINOPHIL NFR BLD AUTO: 5.6 % (ref 0.3–6.2)
ERYTHROCYTE [DISTWIDTH] IN BLOOD BY AUTOMATED COUNT: 16.6 % (ref 12.3–15.4)
GLUCOSE SERPL-MCNC: 93 MG/DL (ref 65–99)
HCT VFR BLD AUTO: 29.4 % (ref 37.5–51)
HGB BLD-MCNC: 9.8 G/DL (ref 13–17.7)
IMM GRANULOCYTES # BLD AUTO: 0 10*3/MM3 (ref 0–0.05)
IMM GRANULOCYTES NFR BLD AUTO: 0 % (ref 0–0.5)
LYMPHOCYTES # BLD AUTO: 1.77 10*3/MM3 (ref 0.7–3.1)
LYMPHOCYTES NFR BLD AUTO: 34 % (ref 19.6–45.3)
MCH RBC QN AUTO: 30 PG (ref 26.6–33)
MCHC RBC AUTO-ENTMCNC: 33.3 G/DL (ref 31.5–35.7)
MCV RBC AUTO: 89.9 FL (ref 79–97)
MONOCYTES # BLD AUTO: 0.47 10*3/MM3 (ref 0.1–0.9)
MONOCYTES NFR BLD AUTO: 9 % (ref 5–12)
NEUTROPHILS NFR BLD AUTO: 2.63 10*3/MM3 (ref 1.7–7)
NEUTROPHILS NFR BLD AUTO: 50.4 % (ref 42.7–76)
NRBC BLD AUTO-RTO: 0 /100 WBC (ref 0–0.2)
PLATELET # BLD AUTO: 280 10*3/MM3 (ref 140–450)
PMV BLD AUTO: 10.5 FL (ref 6–12)
POTASSIUM SERPL-SCNC: 3.9 MMOL/L (ref 3.5–5.2)
RBC # BLD AUTO: 3.27 10*6/MM3 (ref 4.14–5.8)
SODIUM SERPL-SCNC: 142 MMOL/L (ref 136–145)
UNIT  ABO: NORMAL
UNIT  ABO: NORMAL
UNIT  RH: NORMAL
UNIT  RH: NORMAL
WBC NRBC COR # BLD AUTO: 5.21 10*3/MM3 (ref 3.4–10.8)

## 2025-08-08 PROCEDURE — 94799 UNLISTED PULMONARY SVC/PX: CPT

## 2025-08-08 PROCEDURE — 0DBL8ZX EXCISION OF TRANSVERSE COLON, VIA NATURAL OR ARTIFICIAL OPENING ENDOSCOPIC, DIAGNOSTIC: ICD-10-PCS | Performed by: INTERNAL MEDICINE

## 2025-08-08 PROCEDURE — 85025 COMPLETE CBC W/AUTO DIFF WBC: CPT | Performed by: FAMILY MEDICINE

## 2025-08-08 PROCEDURE — 45380 COLONOSCOPY AND BIOPSY: CPT | Performed by: INTERNAL MEDICINE

## 2025-08-08 PROCEDURE — 25010000002 PROPOFOL 10 MG/ML EMULSION: Performed by: NURSE ANESTHETIST, CERTIFIED REGISTERED

## 2025-08-08 PROCEDURE — 0DBN8ZX EXCISION OF SIGMOID COLON, VIA NATURAL OR ARTIFICIAL OPENING ENDOSCOPIC, DIAGNOSTIC: ICD-10-PCS | Performed by: INTERNAL MEDICINE

## 2025-08-08 PROCEDURE — 25010000002 LIDOCAINE 2% SOLUTION: Performed by: NURSE ANESTHETIST, CERTIFIED REGISTERED

## 2025-08-08 PROCEDURE — 0DBH8ZX EXCISION OF CECUM, VIA NATURAL OR ARTIFICIAL OPENING ENDOSCOPIC, DIAGNOSTIC: ICD-10-PCS | Performed by: INTERNAL MEDICINE

## 2025-08-08 PROCEDURE — 99232 SBSQ HOSP IP/OBS MODERATE 35: CPT | Performed by: HOSPITALIST

## 2025-08-08 PROCEDURE — 25810000003 LACTATED RINGERS PER 1000 ML: Performed by: NURSE ANESTHETIST, CERTIFIED REGISTERED

## 2025-08-08 PROCEDURE — 45385 COLONOSCOPY W/LESION REMOVAL: CPT | Performed by: INTERNAL MEDICINE

## 2025-08-08 PROCEDURE — 80048 BASIC METABOLIC PNL TOTAL CA: CPT | Performed by: FAMILY MEDICINE

## 2025-08-08 PROCEDURE — 88305 TISSUE EXAM BY PATHOLOGIST: CPT | Performed by: INTERNAL MEDICINE

## 2025-08-08 RX ORDER — ONDANSETRON 2 MG/ML
4 INJECTION INTRAMUSCULAR; INTRAVENOUS ONCE AS NEEDED
Status: DISCONTINUED | OUTPATIENT
Start: 2025-08-08 | End: 2025-08-08 | Stop reason: HOSPADM

## 2025-08-08 RX ORDER — LIDOCAINE HYDROCHLORIDE 20 MG/ML
INJECTION, SOLUTION INFILTRATION; PERINEURAL AS NEEDED
Status: DISCONTINUED | OUTPATIENT
Start: 2025-08-08 | End: 2025-08-08 | Stop reason: SURG

## 2025-08-08 RX ORDER — SODIUM CHLORIDE, SODIUM LACTATE, POTASSIUM CHLORIDE, CALCIUM CHLORIDE 600; 310; 30; 20 MG/100ML; MG/100ML; MG/100ML; MG/100ML
INJECTION, SOLUTION INTRAVENOUS CONTINUOUS PRN
Status: DISCONTINUED | OUTPATIENT
Start: 2025-08-08 | End: 2025-08-08 | Stop reason: SURG

## 2025-08-08 RX ORDER — PROPOFOL 10 MG/ML
VIAL (ML) INTRAVENOUS AS NEEDED
Status: DISCONTINUED | OUTPATIENT
Start: 2025-08-08 | End: 2025-08-08 | Stop reason: SURG

## 2025-08-08 RX ORDER — SODIUM CHLORIDE, SODIUM LACTATE, POTASSIUM CHLORIDE, CALCIUM CHLORIDE 600; 310; 30; 20 MG/100ML; MG/100ML; MG/100ML; MG/100ML
100 INJECTION, SOLUTION INTRAVENOUS ONCE
Status: DISCONTINUED | OUTPATIENT
Start: 2025-08-08 | End: 2025-08-08 | Stop reason: HOSPADM

## 2025-08-08 RX ORDER — PANTOPRAZOLE SODIUM 40 MG/1
40 TABLET, DELAYED RELEASE ORAL
Status: DISCONTINUED | OUTPATIENT
Start: 2025-08-08 | End: 2025-08-09 | Stop reason: HOSPADM

## 2025-08-08 RX ADMIN — BISACODYL 20 MG: 5 TABLET, COATED ORAL at 04:30

## 2025-08-08 RX ADMIN — PAROXETINE HYDROCHLORIDE 10 MG: 20 TABLET, FILM COATED ORAL at 06:37

## 2025-08-08 RX ADMIN — PROPOFOL 20 MG: 10 INJECTION, EMULSION INTRAVENOUS at 11:58

## 2025-08-08 RX ADMIN — PANTOPRAZOLE SODIUM 40 MG: 40 INJECTION, POWDER, FOR SOLUTION INTRAVENOUS at 06:39

## 2025-08-08 RX ADMIN — SODIUM CHLORIDE, POTASSIUM CHLORIDE, SODIUM LACTATE AND CALCIUM CHLORIDE: 600; 310; 30; 20 INJECTION, SOLUTION INTRAVENOUS at 11:40

## 2025-08-08 RX ADMIN — Medication 10 ML: at 20:09

## 2025-08-08 RX ADMIN — PROPOFOL 20 MG: 10 INJECTION, EMULSION INTRAVENOUS at 11:46

## 2025-08-08 RX ADMIN — PROPOFOL 20 MG: 10 INJECTION, EMULSION INTRAVENOUS at 12:01

## 2025-08-08 RX ADMIN — PROPOFOL 20 MG: 10 INJECTION, EMULSION INTRAVENOUS at 11:50

## 2025-08-08 RX ADMIN — PROPOFOL 20 MG: 10 INJECTION, EMULSION INTRAVENOUS at 11:48

## 2025-08-08 RX ADMIN — POLYETHYLENE GLYCOL-3350 AND ELECTROLYTES 2000 ML: 236; 6.74; 5.86; 2.97; 22.74 POWDER, FOR SOLUTION ORAL at 04:30

## 2025-08-08 RX ADMIN — LIDOCAINE HYDROCHLORIDE 50 MG: 20 INJECTION, SOLUTION INFILTRATION; PERINEURAL at 11:41

## 2025-08-08 RX ADMIN — PROPOFOL 40 MG: 10 INJECTION, EMULSION INTRAVENOUS at 11:44

## 2025-08-08 RX ADMIN — PROPOFOL 20 MG: 10 INJECTION, EMULSION INTRAVENOUS at 11:52

## 2025-08-08 RX ADMIN — PANTOPRAZOLE SODIUM 40 MG: 40 TABLET, DELAYED RELEASE ORAL at 17:43

## 2025-08-08 RX ADMIN — Medication 10 ML: at 09:31

## 2025-08-08 RX ADMIN — PROPOFOL 20 MG: 10 INJECTION, EMULSION INTRAVENOUS at 11:55

## 2025-08-08 RX ADMIN — PROPOFOL 60 MG: 10 INJECTION, EMULSION INTRAVENOUS at 11:41

## 2025-08-08 RX ADMIN — PROPOFOL 40 MG: 10 INJECTION, EMULSION INTRAVENOUS at 11:42

## 2025-08-09 VITALS
DIASTOLIC BLOOD PRESSURE: 79 MMHG | WEIGHT: 189 LBS | BODY MASS INDEX: 28.64 KG/M2 | RESPIRATION RATE: 18 BRPM | OXYGEN SATURATION: 95 % | HEART RATE: 68 BPM | HEIGHT: 68 IN | TEMPERATURE: 97.6 F | SYSTOLIC BLOOD PRESSURE: 153 MMHG

## 2025-08-09 PROBLEM — K51.411: Status: ACTIVE | Noted: 2025-08-09

## 2025-08-09 PROBLEM — D63.8 ANEMIA, CHRONIC DISEASE: Status: ACTIVE | Noted: 2025-08-09

## 2025-08-09 PROBLEM — K51.411: Status: RESOLVED | Noted: 2025-08-09 | Resolved: 2025-08-09

## 2025-08-09 PROBLEM — D50.0 IRON DEFICIENCY ANEMIA DUE TO CHRONIC BLOOD LOSS: Status: RESOLVED | Noted: 2025-08-09 | Resolved: 2025-08-09

## 2025-08-09 PROBLEM — R19.5 DARK STOOLS: Status: RESOLVED | Noted: 2025-07-30 | Resolved: 2025-08-09

## 2025-08-09 PROBLEM — D50.0 IRON DEFICIENCY ANEMIA DUE TO CHRONIC BLOOD LOSS: Status: ACTIVE | Noted: 2025-08-09

## 2025-08-09 PROBLEM — K62.5 RECTAL BLEEDING: Status: RESOLVED | Noted: 2025-08-06 | Resolved: 2025-08-09

## 2025-08-09 PROBLEM — K92.2 GI BLEED: Status: RESOLVED | Noted: 2025-08-06 | Resolved: 2025-08-09

## 2025-08-09 LAB
DEPRECATED RDW RBC AUTO: 52.9 FL (ref 37–54)
ERYTHROCYTE [DISTWIDTH] IN BLOOD BY AUTOMATED COUNT: 16.5 % (ref 12.3–15.4)
HCT VFR BLD AUTO: 30.3 % (ref 37.5–51)
HGB BLD-MCNC: 9.9 G/DL (ref 13–17.7)
MCH RBC QN AUTO: 29.5 PG (ref 26.6–33)
MCHC RBC AUTO-ENTMCNC: 32.7 G/DL (ref 31.5–35.7)
MCV RBC AUTO: 90.2 FL (ref 79–97)
PLATELET # BLD AUTO: 303 10*3/MM3 (ref 140–450)
PMV BLD AUTO: 10.7 FL (ref 6–12)
RBC # BLD AUTO: 3.36 10*6/MM3 (ref 4.14–5.8)
WBC NRBC COR # BLD AUTO: 5.21 10*3/MM3 (ref 3.4–10.8)

## 2025-08-09 PROCEDURE — 99238 HOSP IP/OBS DSCHRG MGMT 30/<: CPT

## 2025-08-09 PROCEDURE — 85027 COMPLETE CBC AUTOMATED: CPT | Performed by: HOSPITALIST

## 2025-08-09 RX ADMIN — Medication 10 ML: at 08:15

## 2025-08-09 RX ADMIN — PAROXETINE HYDROCHLORIDE 10 MG: 20 TABLET, FILM COATED ORAL at 06:41

## 2025-08-09 RX ADMIN — PANTOPRAZOLE SODIUM 40 MG: 40 TABLET, DELAYED RELEASE ORAL at 06:41

## 2025-08-11 ENCOUNTER — READMISSION MANAGEMENT (OUTPATIENT)
Dept: CALL CENTER | Facility: HOSPITAL | Age: 70
End: 2025-08-11
Payer: COMMERCIAL

## 2025-08-11 LAB
CYTO UR: NORMAL
LAB AP CASE REPORT: NORMAL
PATH REPORT.FINAL DX SPEC: NORMAL
PATH REPORT.GROSS SPEC: NORMAL

## 2025-08-18 ENCOUNTER — READMISSION MANAGEMENT (OUTPATIENT)
Dept: CALL CENTER | Facility: HOSPITAL | Age: 70
End: 2025-08-18
Payer: COMMERCIAL

## 2025-08-25 ENCOUNTER — OFFICE VISIT (OUTPATIENT)
Dept: SLEEP MEDICINE | Facility: HOSPITAL | Age: 70
End: 2025-08-25
Payer: MEDICARE

## 2025-08-25 VITALS
WEIGHT: 192 LBS | OXYGEN SATURATION: 98 % | HEART RATE: 76 BPM | DIASTOLIC BLOOD PRESSURE: 69 MMHG | SYSTOLIC BLOOD PRESSURE: 111 MMHG | HEIGHT: 68 IN | BODY MASS INDEX: 29.1 KG/M2

## 2025-08-25 DIAGNOSIS — R06.83 SNORING: ICD-10-CM

## 2025-08-25 DIAGNOSIS — G47.10 HYPERSOMNOLENCE: ICD-10-CM

## 2025-08-25 DIAGNOSIS — E66.3 OVERWEIGHT (BMI 25.0-29.9): ICD-10-CM

## 2025-08-25 DIAGNOSIS — G47.33 OBSTRUCTIVE SLEEP APNEA, ADULT: Primary | ICD-10-CM

## 2025-08-25 PROCEDURE — G0463 HOSPITAL OUTPT CLINIC VISIT: HCPCS

## 2025-08-25 RX ORDER — ZOLPIDEM TARTRATE 5 MG/1
TABLET ORAL
Qty: 2 TABLET | Refills: 0 | Status: SHIPPED | OUTPATIENT
Start: 2025-08-25

## 2025-08-27 ENCOUNTER — READMISSION MANAGEMENT (OUTPATIENT)
Dept: CALL CENTER | Facility: HOSPITAL | Age: 70
End: 2025-08-27
Payer: COMMERCIAL

## 2025-08-27 ENCOUNTER — HOSPITAL ENCOUNTER (OUTPATIENT)
Dept: SLEEP MEDICINE | Facility: HOSPITAL | Age: 70
Discharge: HOME | End: 2025-08-27
Admitting: INTERNAL MEDICINE
Payer: MEDICARE

## 2025-08-27 DIAGNOSIS — G47.33 OBSTRUCTIVE SLEEP APNEA, ADULT: ICD-10-CM

## 2025-08-27 PROCEDURE — 95810 POLYSOM 6/> YRS 4/> PARAM: CPT

## 2025-08-29 ENCOUNTER — READMISSION MANAGEMENT (OUTPATIENT)
Dept: CALL CENTER | Facility: HOSPITAL | Age: 70
End: 2025-08-29
Payer: COMMERCIAL

## (undated) DEVICE — KT ORCA ORCAPOD DISP STRL

## (undated) DEVICE — THE BITE BLOCK MAXI, LATEX FREE STRAP IS USED TO PROTECT THE ENDOSCOPE INSERTION TUBE FROM BEING BITTEN BY THE PATIENT.

## (undated) DEVICE — BW-412T DISP COMBO CLEANING BRUSH: Brand: SINGLE USE COMBINATION CLEANING BRUSH

## (undated) DEVICE — LINER SURG CANSTR SXN S/RIGD 1500CC

## (undated) DEVICE — JACKT LAB F/R KNIT CUFF/COLR XLG BLU

## (undated) DEVICE — SYR LL W/SCALE/MARK 3ML STRL

## (undated) DEVICE — SNAR POLYP CAPTIVATOR/COLD STFF RND 10MM 240CM

## (undated) DEVICE — VIAL FORMALIN CAP 10P 40ML

## (undated) DEVICE — THE SINGLE USE ETRAP – POLYP TRAP IS USED FOR SUCTION RETRIEVAL OF ENDOSCOPICALLY REMOVED POLYPS.: Brand: ETRAP

## (undated) DEVICE — FRCP BX RADJAW4 NDL 2.8 240CM LG OG BX40

## (undated) DEVICE — ADAPT CLN BIOGUARD AIR/H2O DISP

## (undated) DEVICE — Device

## (undated) DEVICE — SOL IRR H2O BO 1000ML STRL

## (undated) DEVICE — GLV SURG SENSICARE PI MIC PF SZ8 LF STRL